# Patient Record
Sex: FEMALE | Race: WHITE
[De-identification: names, ages, dates, MRNs, and addresses within clinical notes are randomized per-mention and may not be internally consistent; named-entity substitution may affect disease eponyms.]

---

## 2019-01-09 ENCOUNTER — HOSPITAL ENCOUNTER (INPATIENT)
Dept: HOSPITAL 84 - D.SDCHOLD | Age: 59
LOS: 10 days | Discharge: HOME HEALTH | DRG: 470 | End: 2019-01-19
Attending: ORTHOPAEDIC SURGERY | Admitting: ORTHOPAEDIC SURGERY
Payer: MEDICAID

## 2019-01-09 VITALS
BODY MASS INDEX: 21.97 KG/M2 | HEIGHT: 59 IN | BODY MASS INDEX: 21.97 KG/M2 | WEIGHT: 109 LBS | BODY MASS INDEX: 21.97 KG/M2

## 2019-01-09 DIAGNOSIS — D62: ICD-10-CM

## 2019-01-09 DIAGNOSIS — N17.9: ICD-10-CM

## 2019-01-09 DIAGNOSIS — E66.9: ICD-10-CM

## 2019-01-09 DIAGNOSIS — I25.10: ICD-10-CM

## 2019-01-09 DIAGNOSIS — K21.9: ICD-10-CM

## 2019-01-09 DIAGNOSIS — I10: ICD-10-CM

## 2019-01-09 DIAGNOSIS — K58.9: ICD-10-CM

## 2019-01-09 DIAGNOSIS — M16.12: Primary | ICD-10-CM

## 2019-01-09 DIAGNOSIS — F41.9: ICD-10-CM

## 2019-01-09 LAB
ANION GAP SERPL CALC-SCNC: 13.1 MMOL/L (ref 8–16)
APPEARANCE UR: CLEAR
APTT BLD: 29.5 SECONDS (ref 22.8–39.4)
BASOPHILS NFR BLD AUTO: 0.3 % (ref 0–2)
BILIRUB SERPL-MCNC: NEGATIVE MG/DL
BUN SERPL-MCNC: 24 MG/DL (ref 7–18)
CALCIUM SERPL-MCNC: 9.5 MG/DL (ref 8.5–10.1)
CHLORIDE SERPL-SCNC: 101 MMOL/L (ref 98–107)
CO2 SERPL-SCNC: 28.5 MMOL/L (ref 21–32)
COLOR UR: YELLOW
CREAT SERPL-MCNC: 1 MG/DL (ref 0.6–1.3)
EOSINOPHIL NFR BLD: 4.6 % (ref 0–7)
ERYTHROCYTE [DISTWIDTH] IN BLOOD BY AUTOMATED COUNT: 12.8 % (ref 11.5–14.5)
GLUCOSE SERPL-MCNC: 94 MG/DL (ref 74–106)
GLUCOSE SERPL-MCNC: NEGATIVE MG/DL
HCT VFR BLD CALC: 40.9 % (ref 36–48)
HGB BLD-MCNC: 14.1 G/DL (ref 12–16)
IMM GRANULOCYTES NFR BLD: 0.2 % (ref 0–5)
INR PPP: 0.97 (ref 0.85–1.17)
KETONES UR STRIP-MCNC: NEGATIVE MG/DL
LYMPHOCYTES NFR BLD AUTO: 28.6 % (ref 15–50)
MCH RBC QN AUTO: 31 PG (ref 26–34)
MCHC RBC AUTO-ENTMCNC: 34.5 G/DL (ref 31–37)
MCV RBC: 89.9 FL (ref 80–100)
MONOCYTES NFR BLD: 6.8 % (ref 2–11)
NEUTROPHILS NFR BLD AUTO: 59.5 % (ref 40–80)
NITRITE UR-MCNC: NEGATIVE MG/ML
OSMOLALITY SERPL CALC.SUM OF ELEC: 281 MOSM/KG (ref 275–300)
PH UR STRIP: 5 [PH] (ref 5–6)
PLATELET # BLD: 223 10X3/UL (ref 130–400)
PMV BLD AUTO: 10.2 FL (ref 7.4–10.4)
POTASSIUM SERPL-SCNC: 3.6 MMOL/L (ref 3.5–5.1)
PROT UR-MCNC: NEGATIVE MG/DL
PROTHROMBIN TIME: 12.4 SECONDS (ref 11.6–15)
RBC # BLD AUTO: 4.55 10X6/UL (ref 4–5.4)
SODIUM SERPL-SCNC: 139 MMOL/L (ref 136–145)
SP GR UR STRIP: 1.01 (ref 1–1.02)
UROBILINOGEN UR-MCNC: NORMAL MG/DL
WBC # BLD AUTO: 5.9 10X3/UL (ref 4.8–10.8)

## 2019-01-15 VITALS — DIASTOLIC BLOOD PRESSURE: 64 MMHG | SYSTOLIC BLOOD PRESSURE: 126 MMHG

## 2019-01-15 VITALS — SYSTOLIC BLOOD PRESSURE: 132 MMHG | DIASTOLIC BLOOD PRESSURE: 58 MMHG

## 2019-01-15 VITALS — SYSTOLIC BLOOD PRESSURE: 94 MMHG | DIASTOLIC BLOOD PRESSURE: 41 MMHG

## 2019-01-15 VITALS — DIASTOLIC BLOOD PRESSURE: 53 MMHG | SYSTOLIC BLOOD PRESSURE: 110 MMHG

## 2019-01-15 PROCEDURE — 0SRB03Z REPLACEMENT OF LEFT HIP JOINT WITH CERAMIC SYNTHETIC SUBSTITUTE, OPEN APPROACH: ICD-10-PCS | Performed by: ORTHOPAEDIC SURGERY

## 2019-01-15 NOTE — OP
PATIENT NAME:  SUMEET SALAS                   MEDICAL RECORD: [id number]
:60                                             LOCATION:D.MS [id number]
                                                         ADMISSION DATE:01/15/19
SURGEON:  LORNE FREY DO         
 
 
DATE OF OPERATION:  01/15/2019
 
PROCEDURE PERFORMED:  Left total hip arthroplasty.
 
PREOPERATIVE DIAGNOSIS:  Severe left hip osteoarthritis.
 
POSTOPERATIVE DIAGNOSIS:  Severe left hip osteoarthritis.
 
INDICATIONS:  Ms. Salas is a 58-year-old female who has had left hip pain
and problems for quite some time.  She has tried all manner of nonoperative
treatment to no avail.  On x-ray, she did have her femoral head protruding into
past the teardrop, appeared to be protruding into the pelvis, but was not on AP
and she had very little to no motion.  As she had been dealing it for quite a
while, it started affecting her activities of daily living and requests a total
hip be done.  I informed her of the risks and benefits of the procedure
including infection, bleeding, damage to nerve and vessels, damage to the
lateral femoral cutaneous nerve, the femoral nerve and due to her size,
infection and need for further surgery.  She is okay with those risks as well as
the risk of blood clot and even death and she signed the consent.
 
SURGEON:  Lorne Frey DO
 
DESCRIPTION OF PROCEDURE:  The patient was given a block by anesthesia
preoperatively, taken to the operative suite, laid in supine position.  She was
intubated and then placed over onto the [facility] table.  She was given 80 mg of
gentamicin and a gram of vancomycin preoperatively.  The left hip was prepped
and draped in sterile fashion.  The timeout was performed.  Everyone was in
agreement with the correct side, site, patient and procedure.  Once that was
done, the incision began over the tensor fascia giovanni.  Careful dissection was
made down to the fascia and any bleeding was coagulated with Aquamantys at the
time.  The fascia was opened.  The fascia was taken anterior to the muscle belly
posteriorly.  The interval then between the rectus was opened and the rectus was
taken medially and the tensor fascia giovanni laterally.  The ascending branch of
the lateral femoral circumflex was then tied off and coagulated with the
Aquamantys and then cut.  Any bleeders were coagulated at that time.  Capsule
was then exposed and the femoral neck was exposed.  The neck was cut.  Then, the
head was removed with somewhat difficulty due to the small size and being so
deep into the acetabulum.  Once it was removed, the labrum was removed and
reaming began up to a 46, a 46 G7 cup was put in and impacted into place.  This
fit very well, was solidly fixed.  The liner was then put in and then the femur
was exposed with somewhat difficulty, but got it exposed.  A canal finder and
cookie cutter were used and then the broaching began at 4, 4 trial was put in
and seemed to be a little varus, it was taken out and then lateralized more with
a cookie cutter and went to a 6, the 6 fit very well.  Once the 6 fit, it was
reduced with a -6 neck and seemed to have good equal lengths to the right side. 
Once that was completed, the hip was reduced with a 32 ceramic head and -6 neck
was placed in a high offset micro stem, Taperloc stem was used.  This was
reduced and the x-rays were taken and seemed to fit very well and be good
lengths compared to the other side.  The wound was then thoroughly irrigated. 
Markus, vancomycin and tobramycin powder were placed deep into the wound and the
tensor fascia giovanni fascia was closed with #1 Vicryl, first a figure-of-eight and
then a running-locking stitch.  Then the skin was closed, the adipose tissue was
 
 
 
OPERATIVE REPORT                               [id number]    SUMEET SALAS 
 
 
approximated with #1 Vicryl with simple sutures and then the skin was closed
with 2-0 Vicryl in an inverted interrupted fashion, 4-0 Monocryl ran on the
skin.  Then, a Prevena incisional VAC was placed on the incision.  The patient
was awakened and taken to recovery in stable condition.  Blood loss was 200 mL
proximally.
 
COMPLICATIONS:  None.
 
TRANSINT:[id number] Voice Confirmation ID: [id number] DOCUMENT ID: [id number]
                                           
                                           LORNE FREY DO         
 
 
 
Electronically Signed by LORNE EDWARD on 01/15/19 at 1614
 
 
 
 
 
 
 
 
 
 
 
 
 
 
 
 
 
 
 
 
 
 
 
 
 
 
 
 
 
 
 
 
CC:                                                             [id number]
DICTATION DATE: 01/15/19 1314     :     01/15/19 1531      ADM IN  
                                                                              
[facility]                                          
[address] [place] [address]

## 2019-01-15 NOTE — NUR
CONTACTED ANESTHESIA REGARDING BORDERLINE HEART RATE AND BLOOD
PRESSURE FOR DISCHARGE. ORDERS RECEIVED FOR EPHEDRINE. SEE MAR. WILL
CONTINUE TO MONITOR.

## 2019-01-16 VITALS — SYSTOLIC BLOOD PRESSURE: 98 MMHG | DIASTOLIC BLOOD PRESSURE: 29 MMHG

## 2019-01-16 VITALS — SYSTOLIC BLOOD PRESSURE: 89 MMHG | DIASTOLIC BLOOD PRESSURE: 52 MMHG

## 2019-01-16 VITALS — DIASTOLIC BLOOD PRESSURE: 58 MMHG | SYSTOLIC BLOOD PRESSURE: 119 MMHG

## 2019-01-16 VITALS — SYSTOLIC BLOOD PRESSURE: 102 MMHG | DIASTOLIC BLOOD PRESSURE: 7 MMHG

## 2019-01-16 VITALS — DIASTOLIC BLOOD PRESSURE: 28 MMHG | SYSTOLIC BLOOD PRESSURE: 97 MMHG

## 2019-01-16 LAB
ANION GAP SERPL CALC-SCNC: 15.3 MMOL/L (ref 8–16)
BUN SERPL-MCNC: 23 MG/DL (ref 7–18)
CALCIUM SERPL-MCNC: 7.5 MG/DL (ref 8.5–10.1)
CHLORIDE SERPL-SCNC: 103 MMOL/L (ref 98–107)
CO2 SERPL-SCNC: 23.7 MMOL/L (ref 21–32)
CREAT SERPL-MCNC: 1.5 MG/DL (ref 0.6–1.3)
ERYTHROCYTE [DISTWIDTH] IN BLOOD BY AUTOMATED COUNT: 13.1 % (ref 11.5–14.5)
GLUCOSE SERPL-MCNC: 138 MG/DL (ref 74–106)
HCT VFR BLD CALC: 30.2 % (ref 36–48)
HGB BLD-MCNC: 10.1 G/DL (ref 12–16)
MCH RBC QN AUTO: 30.4 PG (ref 26–34)
MCHC RBC AUTO-ENTMCNC: 33.4 G/DL (ref 31–37)
MCV RBC: 91 FL (ref 80–100)
OSMOLALITY SERPL CALC.SUM OF ELEC: 281 MOSM/KG (ref 275–300)
PLATELET # BLD: 143 10X3/UL (ref 130–400)
PMV BLD AUTO: 9.8 FL (ref 7.4–10.4)
POTASSIUM SERPL-SCNC: 4 MMOL/L (ref 3.5–5.1)
RBC # BLD AUTO: 3.32 10X6/UL (ref 4–5.4)
SODIUM SERPL-SCNC: 138 MMOL/L (ref 136–145)
WBC # BLD AUTO: 6.9 10X3/UL (ref 4.8–10.8)

## 2019-01-16 NOTE — NUR
PT RESTING IN BED NO SIGNS OF DISTRESS. IV TO RIGHT HAND PATENT NO REDNESS OR
TENDERNESS. HAS INCISION TO LEFT HIP. DRESSING INTACT. HAS WOUND VAC PRESENT.
COMPLAINS OF PAIN AND NAUSEA. MEDS GIVEN. DENIES ANY NEED AT THIS TIME. CALL
LIGHT IN REACH. BED LOW POSITION. FAMILY AT BEDSIDE.

## 2019-01-16 NOTE — NUR
PT RESTING IN BED. ALERT AND ORIENTED. NO SIGNS OF DISTRESS. BREATHING EVEN
AND UNLABORED. PT STATES NO PROBLEMS AT THIS TIME. IV SITE RT HAND DRESSING
CLEAN DRY AND INTACT. NO SIGNS OF INFECTION. SKIN CLEAN DRY AND INTACT. BOWEL
SOUNDS ACTIVE. LT HIP DRESSING CLEAN DRY AND INTACT. WOUND VAC PRESENT WITH NO
DRAINAGE. PLEXI BOOTS ON. WILL CONTINUE PLAN OF CARE. CALL LIGHT IN REACH.

## 2019-01-17 VITALS — SYSTOLIC BLOOD PRESSURE: 118 MMHG | DIASTOLIC BLOOD PRESSURE: 37 MMHG

## 2019-01-17 VITALS — DIASTOLIC BLOOD PRESSURE: 40 MMHG | SYSTOLIC BLOOD PRESSURE: 97 MMHG

## 2019-01-17 VITALS — SYSTOLIC BLOOD PRESSURE: 110 MMHG | DIASTOLIC BLOOD PRESSURE: 52 MMHG

## 2019-01-17 VITALS — SYSTOLIC BLOOD PRESSURE: 108 MMHG | DIASTOLIC BLOOD PRESSURE: 40 MMHG

## 2019-01-17 VITALS — DIASTOLIC BLOOD PRESSURE: 50 MMHG | SYSTOLIC BLOOD PRESSURE: 117 MMHG

## 2019-01-17 VITALS — SYSTOLIC BLOOD PRESSURE: 96 MMHG | DIASTOLIC BLOOD PRESSURE: 34 MMHG

## 2019-01-17 LAB
ANION GAP SERPL CALC-SCNC: 12.8 MMOL/L (ref 8–16)
APPEARANCE UR: CLEAR
BILIRUB SERPL-MCNC: NEGATIVE MG/DL
BUN SERPL-MCNC: 23 MG/DL (ref 7–18)
CALCIUM SERPL-MCNC: 7.8 MG/DL (ref 8.5–10.1)
CHLORIDE SERPL-SCNC: 99 MMOL/L (ref 98–107)
CO2 SERPL-SCNC: 23.7 MMOL/L (ref 21–32)
COLOR UR: YELLOW
CREAT SERPL-MCNC: 1.7 MG/DL (ref 0.6–1.3)
ERYTHROCYTE [DISTWIDTH] IN BLOOD BY AUTOMATED COUNT: 12.9 % (ref 11.5–14.5)
GLUCOSE SERPL-MCNC: 145 MG/DL (ref 74–106)
GLUCOSE SERPL-MCNC: NEGATIVE MG/DL
HCT VFR BLD CALC: 26 % (ref 36–48)
HGB BLD-MCNC: 8.9 G/DL (ref 12–16)
KETONES UR STRIP-MCNC: NEGATIVE MG/DL
MCH RBC QN AUTO: 30.7 PG (ref 26–34)
MCHC RBC AUTO-ENTMCNC: 34.2 G/DL (ref 31–37)
MCV RBC: 89.7 FL (ref 80–100)
NITRITE UR-MCNC: NEGATIVE MG/ML
OSMOLALITY SERPL CALC.SUM OF ELEC: 271 MOSM/KG (ref 275–300)
PH UR STRIP: 5 [PH] (ref 5–6)
PLATELET # BLD: 125 10X3/UL (ref 130–400)
PMV BLD AUTO: 10 FL (ref 7.4–10.4)
POTASSIUM SERPL-SCNC: 3.5 MMOL/L (ref 3.5–5.1)
PROT UR-MCNC: NEGATIVE MG/DL
RBC # BLD AUTO: 2.9 10X6/UL (ref 4–5.4)
RBC #/AREA URNS HPF: (no result) /HPF (ref 0–5)
SODIUM SERPL-SCNC: 132 MMOL/L (ref 136–145)
SP GR UR STRIP: 1.01 (ref 1–1.02)
SQUAMOUS #/AREA URNS HPF: (no result) /HPF (ref 0–5)
UROBILINOGEN UR-MCNC: NORMAL MG/DL
WBC # BLD AUTO: 6.7 10X3/UL (ref 4.8–10.8)
WBC #/AREA URNS HPF: (no result) /HPF (ref 0–5)

## 2019-01-17 NOTE — NUR
HS MEDICATIONS GIVEN. WILL CONTINUE TO MONITOR FOR NEEDS. ASSISTED PT UP TO
USE RESTROOM...AMBULATES WELL WITH WALKER.

## 2019-01-17 NOTE — MORECARE
CASE MANAGEMENT DISCHARGE SUMMARY
 
 
PATIENT: SUMEET MCGREGOR SHARP             UNIT: [id number]
ACCOUNT#: [id number]                       ADM DATE: 01/15/19
AGE: 58     : 60  SEX: F            ROOM/BED: D.2216    
AUTHOR: MARY JORDAN                             PHYSICIAN:                               
 
REFERRING PHYSICIAN: BERNADETTE FREY DO         
DATE OF SERVICE: 19
Discharge Plan
 
 
Patient Name: SUMEET MCGREGOR
Facility: [facility]:[place]
Encounter #: [id number]
Medical Record #: [id number]
: 1960
Planned Disposition: Home or Self Care
Anticipated Discharge Date: 
 
Discharge Date: 
Expected LOS: 
Initial Reviewer: [id number]
Initial Review Date: 01/15/2019
Generated: 19   3:00 pm 
 DCPIA - Discharge Planning Initial Assessment
 
Updated by [email or web address]: Charlene Shea on 19   1:58 pm
*  Is the patient Alert and Oriented?
Yes
*  How many steps to enter\exit or inside your home?
 
*  PCP
[facility]
*  Pharmacy
[facility] ON [address]
*  Preadmission Environment
Home with Family
*  ADLs
Independent
*  Equipment
Bedside Commode
Rolling Walker
*  List name and contact numbers for known caregivers / representatives who 
currently or will assist patient after discharge:
DARREN OROPEZA  [phone]
*  Verbal permission to speak to the caregivers and representatives has been 
obtained from the patient.
N/A
 
*  Community resources currently utilized
None
*  Additional services required to return to the preadmission environment?
Yes
*  Can the patient safely return to the preadmission environment?
Yes
*  Has this patient been hospitalized within the prior 30 days at any 
hospital?
No
 
 
 
 
 
 
Patient Name: SUMEET MCGREGOR
Encounter #: [id number]
Page [id number]
 
 
 
 
 
Electronically Signed by MARY JORDAN on 19 at 1400
 
 
 
 
 
 
**All edits/amendments must be made on the electronic document**
 
DICTATION DATE: 19 1359     : GERARD  19 1359     
RPT#: [id number]                                DC DATE:        
                                               STATUS: ADM IN  
[facility]
 [place] [address]
***END OF REPORT***

## 2019-01-17 NOTE — MORECARE
CASE MANAGEMENT DISCHARGE SUMMARY
 
 
PATIENT: SUMEET MCGREGOR SHARP             UNIT: [id number]
ACCOUNT#: [id number]                       ADM DATE: 01/15/19
AGE: 58     : 60  SEX: F            ROOM/BED: D.2216    
AUTHOR: JULIAN,MARY                             PHYSICIAN:                               
 
REFERRING PHYSICIAN: BERNADETTE FREY DO         
DATE OF SERVICE: 19
Discharge Plan
 
 
Patient Name: SUMEET MCGREGOR
Facility: [facility]:[place]
Encounter #: [id number]
Medical Record #: [id number]
: 1960
Planned Disposition: Home or Self Care
Anticipated Discharge Date: 
 
Discharge Date: 
Expected LOS: 
Initial Reviewer: [id number]
Initial Review Date: 01/15/2019
Generated: 19   3:54 pm 
Comments
 
DCP- Discharge Planning
 
Updated by [id number]: Charlene Shea on 19   1:07 pm CT
Patient Name: SUMEET MCGREGOR                                     
Admission Status: Elective   
Accout number: [id number]                              
Admission Date: 01-   
: 1960                                                        
Admission Diagnosis:   
Attending: BERNADETTE FREY                                                
Current LOS:  2   
  
Anticipated DC Date:    
Planned Disposition: Home or Self Care   
Primary Insurance: [place]   
  
  
Discharge Planning Comments:   
CM met with patient to assess discharge planning needs. Patient stated that 
she is independent with her care at home. Her boyfriend will be the one to 
drive her home and will be the one to help her at home. She has 4 steps to 
enter in her home. She would like to go to OP PT in [place].  [facility] in [place] for  at 3:30 pm. Patient has a 
rollator walker and bsc that was delivered to the hospital. Blanco with PT 
stated that he felt she was safe with the rollator at home.  She feels safe 
to go home. CM will continue to follow and assist with DC planning   
  
  
  
  
  
: Charlene Shea
 DCPIA - Discharge Planning Initial Assessment
 
Updated by [id number]: Charlene Shea on 19   1:58 pm
*  Is the patient Alert and Oriented?
Yes
*  How many steps to enter\exit or inside your home?
 
*  PCP
[facility]
*  Pharmacy
[facility] [address]
*  Preadmission Environment
Home with Family
*  ADLs
Independent
*  Equipment
Bedside Commode
Rolling Walker
*  List name and contact numbers for known caregivers / representatives who 
currently or will assist patient after discharge:
DARREN OROPEZA  [phone]
*  Verbal permission to speak to the caregivers and representatives has been 
obtained from the patient.
N/A
*  Community resources currently utilized
None
*  Additional services required to return to the preadmission environment?
Yes
*  Can the patient safely return to the preadmission environment?
Yes
*  Has this patient been hospitalized within the prior 30 days at any 
hospital?
No
 
 
 
 
 
 
 
Last DP export: 19   1:09 p
Patient Name: SUMEET MCGREGOR
 
Encounter #: [id number]
Page [phone]
 
 
 
 
 
Electronically Signed by MARY JORDAN on 19 at 1454
 
 
 
 
 
 
**All edits/amendments must be made on the electronic document**
 
DICTATION DATE: 19     : GERARD  19     
RPT#: [id number]                                DC DATE:        
                                               STATUS: ADM IN  
[facility]
 [place] [address]
***END OF REPORT***

## 2019-01-17 NOTE — NUR
GAVE ULTRAM 50 MG AND VISTARIL PO PER PRN ORDERS FOR C/O PAIN IN KNEE. WILL
MONITOR FOR EFFECTIVENESS. CALL LIGHT WITHIN REACH.

## 2019-01-17 NOTE — MORECARE
CASE MANAGEMENT DISCHARGE SUMMARY
 
 
PATIENT: SUMEET MCGREGOR SHARP             UNIT: [id number]
ACCOUNT#: [id number]                       ADM DATE: 01/15/19
AGE: 58     : 60  SEX: F            ROOM/BED: D.2216    
AUTHOR: JULIAN,DOC                             PHYSICIAN:                               
 
REFERRING PHYSICIAN: BERNADETTE FREY DO         
DATE OF SERVICE: 19
Discharge Plan
 
 
Patient Name: SUMEET MCGREGOR
Facility: [facility]:[place]
Encounter #: [id number]
Medical Record #: [id number]
: 1960
Planned Disposition: Home or Self Care
Anticipated Discharge Date: 
 
Discharge Date: 
Expected LOS: 
Initial Reviewer: [email or web address]
Initial Review Date: 01/15/2019
Generated: 19   4:03 pm 
Comments
 
DCP- Discharge Planning
 
Updated by [email or web address]: Charlene Shea on 19   1:55 pm CT
[facility] in [place] called back and stated that they do 
not take her insurance. Patient would like to use home health [facility] with [facility] 
(1st choice and [employer] 2nd choice)  I called [facility] spoke with 
[employer]. Clinicals will be faxed and they will accept
DCP- Discharge Planning
 
Updated by [email or web address]: Charlene Shea on 19   1:07 pm CT
Patient Name: SUMEET MCGREGOR                                     
Admission Status: Elective   
Accout number: [id number]                              
Admission Date: 01-   
: 1960                                                        
Admission Diagnosis:   
Attending: BERNADETTE FREY                                                
Current LOS:  2   
  
Anticipated DC Date:    
Planned Disposition: Home or Self Care   
Primary Insurance: MED[place]ID [place]   
 
  
  
Discharge Planning Comments:   
CM met with patient to assess discharge planning needs. Patient stated that 
she is independent with her care at home. Her boyfriend will be the one to 
drive her home and will be the one to help her at home. She has 4 steps to 
enter in her home. She would like to go to OP PT in [place].  [facility] in [place] for  at 3:30 pm. Patient has a 
rollator walker and bsc that was delivered to the hospital. Blanco with PT 
stated that he felt she was safe with the rollator at home.  She feels safe 
to go home. CM will continue to follow and assist with DC planning   
  
  
  
  
  
: Charlene Shea
 DCPIA - Discharge Planning Initial Assessment
 
Updated by [email or web address]: Charlene Shea on 19   1:58 pm
*  Is the patient Alert and Oriented?
Yes
*  How many steps to enter\exit or inside your home?
 
*  PCP
[facility]
*  Pharmacy
[facility] [address]
*  Preadmission Environment
Home with Family
*  ADLs
Independent
*  Equipment
Bedside Commode
Rolling Walker
*  List name and contact numbers for known caregivers / representatives who 
currently or will assist patient after discharge:
DARREN OROPEZA  [phone]
*  Verbal permission to speak to the caregivers and representatives has been 
obtained from the patient.
N/A
*  Community resources currently utilized
None
*  Additional services required to return to the preadmission environment?
Yes
*  Can the patient safely return to the preadmission environment?
Yes
*  Has this patient been hospitalized within the prior 30 days at any 
hospital?
No
 
External Providers
 
[facility]
 
Next Contact Date: 
Service Request Date: 
Service Type: 
Resolution: 
 
Reviewer: 
Comments: 
 
 
 
 
 
 
Last DP export: 19   1:54 p
Patient Name: SUMEET MCGREGOR
Encounter #: [id number]
Page [phone]
 
 
 
 
 
Electronically Signed by MARY JORDAN on 19 at 1503
 
 
 
 
 
 
**All edits/amendments must be made on the electronic document**
 
DICTATION DATE: 19     : GERARD  19 1503     
RPT#: [id number]                                DC DATE:        
                                               STATUS: ADM IN  
[facility]
[address] [place] [address]
***END OF REPORT***

## 2019-01-17 NOTE — NUR
REPORT RECEIVED AND CARE OF PT ASSUMED. PT LYING IN SEMI HALE'S POSITION
WATCHING TV. NO IV AT THIS TIME. PROVENA WOUND VAC IN PLACE ON LEFT HIP
INCISION. WILL MONITOR FOR NEEDS.

## 2019-01-18 VITALS — SYSTOLIC BLOOD PRESSURE: 83 MMHG | DIASTOLIC BLOOD PRESSURE: 47 MMHG

## 2019-01-18 VITALS — DIASTOLIC BLOOD PRESSURE: 45 MMHG | SYSTOLIC BLOOD PRESSURE: 104 MMHG

## 2019-01-18 VITALS — SYSTOLIC BLOOD PRESSURE: 118 MMHG | DIASTOLIC BLOOD PRESSURE: 49 MMHG

## 2019-01-18 VITALS — DIASTOLIC BLOOD PRESSURE: 56 MMHG | SYSTOLIC BLOOD PRESSURE: 131 MMHG

## 2019-01-18 LAB
ALBUMIN SERPL-MCNC: 2.5 G/DL (ref 3.4–5)
ALP SERPL-CCNC: 79 U/L (ref 46–116)
ALT SERPL-CCNC: 30 U/L (ref 10–68)
ANION GAP SERPL CALC-SCNC: 14.1 MMOL/L (ref 8–16)
BASOPHILS NFR BLD AUTO: 0 % (ref 0–2)
BILIRUB SERPL-MCNC: 0.46 MG/DL (ref 0.2–1.3)
BUN SERPL-MCNC: 17 MG/DL (ref 7–18)
CALCIUM SERPL-MCNC: 8.3 MG/DL (ref 8.5–10.1)
CHLORIDE SERPL-SCNC: 102 MMOL/L (ref 98–107)
CO2 SERPL-SCNC: 22.9 MMOL/L (ref 21–32)
CREAT SERPL-MCNC: 1.7 MG/DL (ref 0.6–1.3)
EOSINOPHIL NFR BLD: 2.5 % (ref 0–7)
ERYTHROCYTE [DISTWIDTH] IN BLOOD BY AUTOMATED COUNT: 12.8 % (ref 11.5–14.5)
GLOBULIN SER-MCNC: 3.6 G/L
GLUCOSE SERPL-MCNC: 120 MG/DL (ref 74–106)
HCT VFR BLD CALC: 26.1 % (ref 36–48)
HGB BLD-MCNC: 9 G/DL (ref 12–16)
IMM GRANULOCYTES NFR BLD: 0.2 % (ref 0–5)
LYMPHOCYTES NFR BLD AUTO: 13.7 % (ref 15–50)
MCH RBC QN AUTO: 30.7 PG (ref 26–34)
MCHC RBC AUTO-ENTMCNC: 34.5 G/DL (ref 31–37)
MCV RBC: 89.1 FL (ref 80–100)
MONOCYTES NFR BLD: 7.8 % (ref 2–11)
NEUTROPHILS NFR BLD AUTO: 75.8 % (ref 40–80)
OSMOLALITY SERPL CALC.SUM OF ELEC: 272 MOSM/KG (ref 275–300)
PLATELET # BLD: 165 10X3/UL (ref 130–400)
PMV BLD AUTO: 10.1 FL (ref 7.4–10.4)
POTASSIUM SERPL-SCNC: 4 MMOL/L (ref 3.5–5.1)
PROT SERPL-MCNC: 6.1 G/DL (ref 6.4–8.2)
RBC # BLD AUTO: 2.93 10X6/UL (ref 4–5.4)
SODIUM SERPL-SCNC: 135 MMOL/L (ref 136–145)
WBC # BLD AUTO: 6.4 10X3/UL (ref 4.8–10.8)

## 2019-01-18 NOTE — NUR
HAS REMAINED WITHOUT DISTRESS.REMAINS WITHOUT CHANGE FROM INTITIAL SHIFT
ASSESSMENT.CONT PLAN OF CARE

## 2019-01-18 NOTE — NUR
REPORT RECEIVED AND CARE OF PT ASSUMED. PT LYING IN SUPINE POSITION WITH EYES
CLOSED. NO IV SITED. PROVENA WOUND VAC IN PLACE ON LEFT HIP. WILL MONITOR FOR
NEEDS.

## 2019-01-18 NOTE — NUR
HS MEDICATIONS GIVEN TO INCLUDE ZOFRAN, ULTRAM, AND VISTARIL FOR NAUSEA AND
PAIN. WILL CONTINUE TO MONITOR FOR NEEDS. CALL LIGHT WITHIN REACH.

## 2019-01-19 VITALS — DIASTOLIC BLOOD PRESSURE: 43 MMHG | SYSTOLIC BLOOD PRESSURE: 101 MMHG

## 2019-01-19 VITALS — DIASTOLIC BLOOD PRESSURE: 40 MMHG | SYSTOLIC BLOOD PRESSURE: 119 MMHG

## 2019-01-19 VITALS — DIASTOLIC BLOOD PRESSURE: 40 MMHG | SYSTOLIC BLOOD PRESSURE: 110 MMHG

## 2019-01-19 VITALS — SYSTOLIC BLOOD PRESSURE: 101 MMHG | DIASTOLIC BLOOD PRESSURE: 43 MMHG

## 2019-01-19 LAB
ANION GAP SERPL CALC-SCNC: 13.9 MMOL/L (ref 8–16)
BUN SERPL-MCNC: 19 MG/DL (ref 7–18)
CALCIUM SERPL-MCNC: 8.7 MG/DL (ref 8.5–10.1)
CHLORIDE SERPL-SCNC: 105 MMOL/L (ref 98–107)
CO2 SERPL-SCNC: 24.1 MMOL/L (ref 21–32)
CREAT SERPL-MCNC: 1.6 MG/DL (ref 0.6–1.3)
GLUCOSE SERPL-MCNC: 89 MG/DL (ref 74–106)
OSMOLALITY SERPL CALC.SUM OF ELEC: 278 MOSM/KG (ref 275–300)
POTASSIUM SERPL-SCNC: 4 MMOL/L (ref 3.5–5.1)
SODIUM SERPL-SCNC: 139 MMOL/L (ref 136–145)

## 2019-01-20 NOTE — MORECARE
CASE MANAGEMENT DISCHARGE SUMMARY
 
 
PATIENT: SUMEET MCGREGOR SHARP             UNIT: [id number]
ACCOUNT#: [id number]                       ADM DATE: 01/15/19
AGE: 58     : 60  SEX: F            ROOM/BED: D.0836    
AUTHOR: JULIAN,DOC                             PHYSICIAN:                               
 
REFERRING PHYSICIAN: BERNADETTE FREY DO         
DATE OF SERVICE: 19
Discharge Plan
 
 
Patient Name: SUMEET MCGREGOR
Facility: [facility]:[place]
Encounter #: [id number]
Medical Record #: [id number]
: 1960
Planned Disposition: Home or Self Care
Anticipated Discharge Date: 
 
Discharge Date: 2019
Expected LOS: 
Initial Reviewer: [email or web address]
Initial Review Date: 01/15/2019
Generated: 19   2:32 pm 
Comments
 
DCP- Discharge Planning
 
Updated by [email or web address]: Dana Alvarado on 19  12:30 pm CT
CONTACT INFORMATION   
[phone]
DCP- Discharge Planning
 
Updated by [id number]: Dana Alvarado on 19  12:29 pm CT
LATE ENTRY 19  
PATIENT FOR DISCHARGE TO HOME WITH [facility].  
CM VISITED AT THE BEDSIDE.PATIENT HAD COMMODE AT THE BEDSIDE. STATED SHE 
THOUGHT THEY WERE TO DELIVER THE COMMODE TO HER HOME. SHE DID NOT FEEL THE 
COMMODE WOULD WORK.   
DESCRIBED A RAISED TOILET SEAT WITH HAND RAILS. SHE DID NOT KNOW THE 
PROVIDERS NAME. CM CHECKED THE LABEL.   
TELEPHONED [facility]. EXPLAINED PATIENT'S CONCERN. HAD THE ON CALL 
TO SPEAK DIRECTLY TO THE PATIENT TO ANSWER QUESTIONS AND RESOLVE ISSUE 
REGARDING DME.  
1452 TC TO [facility] TO ADVISE OF PLANNED DISCHARGE. SPOKE WITH 
JHONNY THEN THE ON CALL.  
FAXED DISCHARGE INSTRUCTIONS AND MED SHEET .
 
DCP- Discharge Planning
 
Updated by [id number]: Charlene Shea on 19   1:55 pm CT
[facility] in [place] called back and stated that they do 
not take her insurance. Patient would like to use home health [place] with [facility] 
(1st choice and [facility] 2nd choice)  I called [facility] spoke with 
[employer]. Clinicals will be faxed and they will accept
DCP- Discharge Planning
 
Updated by [id number]: Charlene Shea on 19   1:07 pm CT
Patient Name: SUMEET MCGREGOR                                     
Admission Status: Elective   
Accout number: [id number]                              
Admission Date: 01-   
: 1960                                                        
Admission Diagnosis:   
Attending: BERNADETTE FREY                                                
Current LOS:  2   
  
Anticipated DC Date:    
Planned Disposition: Home or Self Care   
Primary Insurance: [place]   
  
  
Discharge Planning Comments:   
CM met with patient to assess discharge planning needs. Patient stated that 
she is independent with her care at home. Her boyfriend will be the one to 
drive her home and will be the one to help her at home. She has 4 steps to 
enter in her home. She would like to go to OP PT in [place].  [facility] in [place] for  at 3:30 pm. Patient has a 
rollator walker and bsc that was delivered to the hospital. Blanco with PT 
stated that he felt she was safe with the rollator at home.  She feels safe 
to go home. CM will continue to follow and assist with DC planning   
  
  
  
  
  
: Charlene Shea
 DCPIA - Discharge Planning Initial Assessment
 
Updated by [id number]: Charlene Shea on 19   1:58 pm
*  Is the patient Alert and Oriented?
Yes
*  How many steps to enter\exit or inside your home?
 
*  PCP
[facility]
*  Pharmacy
[facility] [address]
*  Preadmission Environment
Home with Family
 
*  ADLs
Independent
*  Equipment
Bedside Commode
Rolling Walker
*  List name and contact numbers for known caregivers / representatives who 
currently or will assist patient after discharge:
DARREN OROPEZA  [phone]
*  Verbal permission to speak to the caregivers and representatives has been 
obtained from the patient.
N/A
*  Community resources currently utilized
None
*  Additional services required to return to the preadmission environment?
Yes
*  Can the patient safely return to the preadmission environment?
Yes
*  Has this patient been hospitalized within the prior 30 days at any 
hospital?
No
 
 
 
 
 
 
 
Last DP export: 19   2:03 p
Patient Name: SUMEET MCGREGOR
Encounter #: [id number]
Page [phone]
 
 
 
 
 
Electronically Signed by MARY JORDAN on 19 at 1332
 
 
 
 
 
 
**All edits/amendments must be made on the electronic document**
 
DICTATION DATE: 19 1331     : DM  19 1331     
RPT#: [id number]                                DC DATE:19
                                               STATUS: DIS IN  
[facility]
[address] [place] [address]
***END OF REPORT***

## 2019-01-24 ENCOUNTER — HOSPITAL ENCOUNTER (OUTPATIENT)
Dept: HOSPITAL 84 - D.LABREF | Age: 59
Discharge: HOME | End: 2019-01-24
Attending: FAMILY MEDICINE
Payer: MEDICAID

## 2019-01-24 VITALS — BODY MASS INDEX: 22 KG/M2

## 2019-01-24 DIAGNOSIS — I10: Primary | ICD-10-CM

## 2019-01-24 DIAGNOSIS — I25.10: ICD-10-CM

## 2019-01-24 LAB
ALBUMIN SERPL-MCNC: 2.8 G/DL (ref 3.4–5)
ALP SERPL-CCNC: 96 U/L (ref 46–116)
ALT SERPL-CCNC: 34 U/L (ref 10–68)
ANION GAP SERPL CALC-SCNC: 14.5 MMOL/L (ref 8–16)
APPEARANCE UR: CLEAR
BASOPHILS NFR BLD AUTO: 0.3 % (ref 0–2)
BILIRUB SERPL-MCNC: 0.2 MG/DL (ref 0.2–1.3)
BILIRUB SERPL-MCNC: NEGATIVE MG/DL
BUN SERPL-MCNC: 33 MG/DL (ref 7–18)
CALCIUM SERPL-MCNC: 8.5 MG/DL (ref 8.5–10.1)
CHLORIDE SERPL-SCNC: 100 MMOL/L (ref 98–107)
CO2 SERPL-SCNC: 29.5 MMOL/L (ref 21–32)
COLOR UR: YELLOW
CREAT SERPL-MCNC: 1.5 MG/DL (ref 0.6–1.3)
EOSINOPHIL NFR BLD: 4.6 % (ref 0–7)
ERYTHROCYTE [DISTWIDTH] IN BLOOD BY AUTOMATED COUNT: 13.2 % (ref 11.5–14.5)
GLOBULIN SER-MCNC: 3.4 G/L
GLUCOSE SERPL-MCNC: 82 MG/DL (ref 74–106)
GLUCOSE SERPL-MCNC: NEGATIVE MG/DL
HCT VFR BLD CALC: 27 % (ref 36–48)
HGB BLD-MCNC: 8.6 G/DL (ref 12–16)
IMM GRANULOCYTES NFR BLD: 0.4 % (ref 0–5)
KETONES UR STRIP-MCNC: NEGATIVE MG/DL
LYMPHOCYTES NFR BLD AUTO: 18 % (ref 15–50)
MCH RBC QN AUTO: 30 PG (ref 26–34)
MCHC RBC AUTO-ENTMCNC: 31.9 G/DL (ref 31–37)
MCV RBC: 94.1 FL (ref 80–100)
MONOCYTES NFR BLD: 7.4 % (ref 2–11)
NEUTROPHILS NFR BLD AUTO: 69.3 % (ref 40–80)
NITRITE UR-MCNC: NEGATIVE MG/ML
OSMOLALITY SERPL CALC.SUM OF ELEC: 283 MOSM/KG (ref 275–300)
PH UR STRIP: 7 [PH] (ref 5–6)
PLATELET # BLD: 345 10X3/UL (ref 130–400)
PMV BLD AUTO: 8.8 FL (ref 7.4–10.4)
POTASSIUM SERPL-SCNC: 5 MMOL/L (ref 3.5–5.1)
PROT SERPL-MCNC: 6.2 G/DL (ref 6.4–8.2)
PROT UR-MCNC: NEGATIVE MG/DL
RBC # BLD AUTO: 2.87 10X6/UL (ref 4–5.4)
SODIUM SERPL-SCNC: 139 MMOL/L (ref 136–145)
SP GR UR STRIP: 1 (ref 1–1.02)
UROBILINOGEN UR-MCNC: NORMAL MG/DL
WBC # BLD AUTO: 6.8 10X3/UL (ref 4.8–10.8)

## 2019-01-25 NOTE — MORECARE
CASE MANAGEMENT DISCHARGE SUMMARY
 
 
PATIENT: SUMEET MCGREGOR SHARP             UNIT: [id number]
ACCOUNT#: [id number]                       ADM DATE: 01/15/19
AGE: 59     : 60  SEX: F            ROOM/BED: D.0366    
AUTHOR: JULIAN,DOC                             PHYSICIAN:                               
 
REFERRING PHYSICIAN: BERNADETTE FREY DO         
DATE OF SERVICE: 19
Discharge Plan
 
 
Patient Name: SUMEET MCGREGOR
Facility: [facility]:[place]
Encounter #: [id number]
Medical Record #: [id number]
: 1960
Planned Disposition: Home or Self Care
Anticipated Discharge Date: 
 
Discharge Date: 2019
Expected LOS: 0
Initial Reviewer: [id number]
Initial Review Date: 01/15/2019
Generated: 19  12:46 pm 
Comments
 
DCP- Discharge Planning
 
Updated by [id number]: Dana Alvarado on 19  12:30 pm CT
CONTACT INFORMATION   
[phone]
DCP- Discharge Planning
 
Updated by [id number]: Dana Alvarado on 19  12:29 pm CT
LATE ENTRY 19  
PATIENT FOR DISCHARGE TO HOME WITH [facility].  
CM VISITED AT THE BEDSIDE.PATIENT HAD COMMODE AT THE BEDSIDE. STATED SHE 
THOUGHT THEY WERE TO DELIVER THE COMMODE TO HER HOME. SHE DID NOT FEEL THE 
COMMODE WOULD WORK.   
DESCRIBED A RAISED TOILET SEAT WITH HAND RAILS. SHE DID NOT KNOW THE 
PROVIDERS NAME. CM CHECKED THE LABEL.   
TELEPHONED [facility]. EXPLAINED PATIENT'S CONCERN. HAD THE ON CALL 
TO SPEAK DIRECTLY TO THE PATIENT TO ANSWER QUESTIONS AND RESOLVE ISSUE 
REGARDING DME.  
1452 TC TO [facility] TO ADVISE OF PLANNED DISCHARGE. SPOKE WITH 
JHONNY THEN THE ON CALL.  
FAXED DISCHARGE INSTRUCTIONS AND MED SHEET .
 
DCP- Discharge Planning
 
Updated by [id number]: Charlene Shea on 19   1:55 pm CT
[facility] in [place] called back and stated that they do 
not take her insurance. Patient would like to use home health [facility] 
(1st choice and [facility] 2nd choice)  I called [facility] spoke with 
[facility]. Clinicals will be faxed and they will accept
DCP- Discharge Planning
 
Updated by [id number]: Charlene Shea on 19   1:07 pm CT
Patient Name: SUMEET MCGREGOR                                     
Admission Status: Elective   
Accout number: [id number]                              
Admission Date: 01-   
: 1960                                                        
Admission Diagnosis:   
Attending: BERNADETTE FREY                                                
Current LOS:  2   
  
Anticipated DC Date:    
Planned Disposition: Home or Self Care   
Primary Insurance: [place]   
  
  
Discharge Planning Comments:   
CM met with patient to assess discharge planning needs. Patient stated that 
she is independent with her care at home. Her boyfriend will be the one to 
drive her home and will be the one to help her at home. She has 4 steps to 
enter in her home. She would like to go to OP PT in [place].  [facility] in [place] for  at 3:30 pm. Patient has a 
rollator walker and bsc that was delivered to the hospital. Blanco with PT 
stated that he felt she was safe with the rollator at home.  She feels safe 
to go home. CM will continue to follow and assist with DC planning   
  
  
  
  
  
: Charlene Shea
 DCPIA - Discharge Planning Initial Assessment
 
Updated by [id number]: Charlene Shea on 19   1:58 pm
*  Is the patient Alert and Oriented?
Yes
*  How many steps to enter\exit or inside your home?
 
*  PCP
[facility]
*  Pharmacy
[facility] [address]
*  Preadmission Environment
Home with Family
 
*  ADLs
Independent
*  Equipment
Bedside Commode
Rolling Walker
*  List name and contact numbers for known caregivers / representatives who 
currently or will assist patient after discharge:
DARREN OROPEZA  [phone]
*  Verbal permission to speak to the caregivers and representatives has been 
obtained from the patient.
N/A
*  Community resources currently utilized
None
*  Additional services required to return to the preadmission environment?
Yes
*  Can the patient safely return to the preadmission environment?
Yes
*  Has this patient been hospitalized within the prior 30 days at any 
hospital?
No
 
 
 
 
 
 
 
Last DP export: 19  12:32 p
Patient Name: SUMEET MCGREGOR
Encounter #: [id number]
Page [id number]
 
 
 
 
 
Electronically Signed by MARY JORDAN on 19 at 1146
 
 
 
 
 
 
**All edits/amendments must be made on the electronic document**
 
DICTATION DATE: 19 0987     : GERARD  19 1145     
RPT#: [id number]                                DC DATE:19
                                               STATUS: DIS IN  
[facility]
[address] [place] [address]
***END OF REPORT***

## 2019-02-22 ENCOUNTER — HOSPITAL ENCOUNTER (OUTPATIENT)
Dept: HOSPITAL 84 - D.MRI | Age: 59
Discharge: HOME | End: 2019-02-22
Attending: NURSE PRACTITIONER
Payer: MEDICAID

## 2019-02-22 VITALS — BODY MASS INDEX: 22 KG/M2

## 2019-02-22 DIAGNOSIS — M25.562: Primary | ICD-10-CM

## 2019-08-01 ENCOUNTER — HOSPITAL ENCOUNTER (INPATIENT)
Dept: HOSPITAL 84 - D.MS | Age: 59
LOS: 8 days | Discharge: HOME HEALTH | DRG: 470 | End: 2019-08-09
Attending: ORTHOPAEDIC SURGERY | Admitting: ORTHOPAEDIC SURGERY
Payer: MEDICAID

## 2019-08-01 VITALS
BODY MASS INDEX: 39.79 KG/M2 | WEIGHT: 200 LBS | HEIGHT: 59.5 IN | BODY MASS INDEX: 39.79 KG/M2 | BODY MASS INDEX: 39.79 KG/M2

## 2019-08-01 DIAGNOSIS — I10: ICD-10-CM

## 2019-08-01 DIAGNOSIS — E78.5: ICD-10-CM

## 2019-08-01 DIAGNOSIS — F41.0: ICD-10-CM

## 2019-08-01 DIAGNOSIS — M54.9: ICD-10-CM

## 2019-08-01 DIAGNOSIS — K21.9: ICD-10-CM

## 2019-08-01 DIAGNOSIS — J45.909: ICD-10-CM

## 2019-08-01 DIAGNOSIS — M16.11: Primary | ICD-10-CM

## 2019-08-01 DIAGNOSIS — D62: ICD-10-CM

## 2019-08-01 DIAGNOSIS — K58.9: ICD-10-CM

## 2019-08-01 DIAGNOSIS — F41.9: ICD-10-CM

## 2019-08-01 DIAGNOSIS — M19.90: ICD-10-CM

## 2019-08-01 DIAGNOSIS — N95.9: ICD-10-CM

## 2019-08-01 DIAGNOSIS — I20.9: ICD-10-CM

## 2019-08-05 LAB
ANION GAP SERPL CALC-SCNC: 11.5 MMOL/L (ref 8–16)
APPEARANCE UR: CLEAR
BASOPHILS NFR BLD AUTO: 0.2 % (ref 0–2)
BILIRUB SERPL-MCNC: NEGATIVE MG/DL
BUN SERPL-MCNC: 17 MG/DL (ref 7–18)
CALCIUM SERPL-MCNC: 9.1 MG/DL (ref 8.5–10.1)
CHLORIDE SERPL-SCNC: 107 MMOL/L (ref 98–107)
CO2 SERPL-SCNC: 27.8 MMOL/L (ref 21–32)
COLOR UR: YELLOW
CREAT SERPL-MCNC: 0.9 MG/DL (ref 0.6–1.3)
EOSINOPHIL NFR BLD: 5.6 % (ref 0–7)
ERYTHROCYTE [DISTWIDTH] IN BLOOD BY AUTOMATED COUNT: 13 % (ref 11.5–14.5)
GLUCOSE SERPL-MCNC: 97 MG/DL (ref 74–106)
GLUCOSE SERPL-MCNC: NEGATIVE MG/DL
HCT VFR BLD CALC: 36.7 % (ref 36–48)
HGB BLD-MCNC: 12.3 G/DL (ref 12–16)
IMM GRANULOCYTES NFR BLD: 0.2 % (ref 0–5)
INR PPP: 1.04 (ref 0.85–1.17)
KETONES UR STRIP-MCNC: NEGATIVE MG/DL
LYMPHOCYTES NFR BLD AUTO: 28.2 % (ref 15–50)
MCH RBC QN AUTO: 30.4 PG (ref 26–34)
MCHC RBC AUTO-ENTMCNC: 33.5 G/DL (ref 31–37)
MCV RBC: 90.8 FL (ref 80–100)
MONOCYTES NFR BLD: 5 % (ref 2–11)
NEUTROPHILS NFR BLD AUTO: 60.8 % (ref 40–80)
NITRITE UR-MCNC: NEGATIVE MG/ML
OSMOLALITY SERPL CALC.SUM OF ELEC: 284 MOSM/KG (ref 275–300)
PH UR STRIP: 5 [PH] (ref 5–6)
PLATELET # BLD: 188 10X3/UL (ref 130–400)
PMV BLD AUTO: 9.9 FL (ref 7.4–10.4)
POTASSIUM SERPL-SCNC: 4.3 MMOL/L (ref 3.5–5.1)
PROT UR-MCNC: NEGATIVE MG/DL
PROTHROMBIN TIME: 13.1 SECONDS (ref 11.6–15)
RBC # BLD AUTO: 4.04 10X6/UL (ref 4–5.4)
SODIUM SERPL-SCNC: 142 MMOL/L (ref 136–145)
SP GR UR STRIP: 1 (ref 1–1.02)
UROBILINOGEN UR-MCNC: NORMAL MG/DL
WBC # BLD AUTO: 5 10X3/UL (ref 4.8–10.8)

## 2019-08-06 VITALS — SYSTOLIC BLOOD PRESSURE: 137 MMHG | DIASTOLIC BLOOD PRESSURE: 61 MMHG

## 2019-08-06 VITALS — DIASTOLIC BLOOD PRESSURE: 61 MMHG | SYSTOLIC BLOOD PRESSURE: 137 MMHG

## 2019-08-06 VITALS — SYSTOLIC BLOOD PRESSURE: 137 MMHG | DIASTOLIC BLOOD PRESSURE: 58 MMHG

## 2019-08-06 VITALS — DIASTOLIC BLOOD PRESSURE: 31 MMHG | SYSTOLIC BLOOD PRESSURE: 96 MMHG

## 2019-08-06 VITALS — SYSTOLIC BLOOD PRESSURE: 96 MMHG | DIASTOLIC BLOOD PRESSURE: 31 MMHG

## 2019-08-06 VITALS — SYSTOLIC BLOOD PRESSURE: 94 MMHG | DIASTOLIC BLOOD PRESSURE: 38 MMHG

## 2019-08-06 LAB
ANION GAP SERPL CALC-SCNC: 14.7 MMOL/L (ref 8–16)
APTT BLD: 28.5 SECONDS (ref 22.8–39.4)
BASOPHILS NFR BLD AUTO: 0.1 % (ref 0–2)
BUN SERPL-MCNC: 20 MG/DL (ref 7–18)
CALCIUM SERPL-MCNC: 8.2 MG/DL (ref 8.5–10.1)
CHLORIDE SERPL-SCNC: 108 MMOL/L (ref 98–107)
CO2 SERPL-SCNC: 20.7 MMOL/L (ref 21–32)
CREAT SERPL-MCNC: 1 MG/DL (ref 0.6–1.3)
EOSINOPHIL NFR BLD: 1.2 % (ref 0–7)
ERYTHROCYTE [DISTWIDTH] IN BLOOD BY AUTOMATED COUNT: 13 % (ref 11.5–14.5)
GLUCOSE SERPL-MCNC: 122 MG/DL (ref 74–106)
HCT VFR BLD CALC: 33.3 % (ref 36–48)
HGB BLD-MCNC: 11.1 G/DL (ref 12–16)
IMM GRANULOCYTES NFR BLD: 0.4 % (ref 0–5)
INR PPP: 1.09 (ref 0.85–1.17)
LYMPHOCYTES NFR BLD AUTO: 9.6 % (ref 15–50)
MCH RBC QN AUTO: 30.6 PG (ref 26–34)
MCHC RBC AUTO-ENTMCNC: 33.3 G/DL (ref 31–37)
MCV RBC: 91.7 FL (ref 80–100)
MONOCYTES NFR BLD: 1.6 % (ref 2–11)
NEUTROPHILS NFR BLD AUTO: 87.1 % (ref 40–80)
OSMOLALITY SERPL CALC.SUM OF ELEC: 281 MOSM/KG (ref 275–300)
PLATELET # BLD: 183 10X3/UL (ref 130–400)
PMV BLD AUTO: 9.9 FL (ref 7.4–10.4)
POTASSIUM SERPL-SCNC: 4.4 MMOL/L (ref 3.5–5.1)
PROTHROMBIN TIME: 13.6 SECONDS (ref 11.6–15)
RBC # BLD AUTO: 3.63 10X6/UL (ref 4–5.4)
SODIUM SERPL-SCNC: 139 MMOL/L (ref 136–145)
WBC # BLD AUTO: 11.9 10X3/UL (ref 4.8–10.8)

## 2019-08-06 PROCEDURE — 0SR90J9 REPLACEMENT OF RIGHT HIP JOINT WITH SYNTHETIC SUBSTITUTE, CEMENTED, OPEN APPROACH: ICD-10-PCS | Performed by: ORTHOPAEDIC SURGERY

## 2019-08-06 NOTE — NUR
JUST ARRIVED TO ROOM 2201. AWAKE AND ALERT EASILY TO AROUSED WHEN NAME IS
CALLED. RESP EVEN AND UNLABORED WITH NO DISTRESS NOTED. CAN EXPRESS NEEDS AND
WANTS. DRESSING CLEAN DRY AND INTACT TO RIGHT HIP WITH ICE PACK INTACT.
 AND C/L IN REACH AT BEDSIDE.

## 2019-08-06 NOTE — NUR
0812 DR. SAGASTUME HERE TO EVALUATE PT
0817 DR. SAGASTUME SAID TO PROCEED WITH GETTING PT READY FOR SURGERY.

## 2019-08-06 NOTE — NUR
1986 PT REPORTS HAVING CHEST PAIN LAST EVENING AND TOOK ONE NTG SL WITHOUT
RELIEF.  SHE SAID AFTER SHE STARTED PASSING FLATUS, THE PAIN IN HER CHEST
SUBSIDED.  PT REPORTS SHE HAS HAD A CARDIAC WORKUP 4 YEARS AGO. NO STENTS,NO
MI. HX OF PALPITATIONS.  STATES SHE HAD SOME PALPITATIONS LAST NIGHT AND THAT
WAS ANOTHER REASON FOR HER TO TAKE NTG.  DENIES CP AT PRESENT. NO PALPITATIONS
CURRENTLY.

## 2019-08-06 NOTE — NUR
0756  DR. SAGASTUME NOTIFIED OF PT'S REPORTING OF RECENT CHEST PAIN AND
PALPITATIONS. UPON FURTHER QUESTIONING, SHE REPORTS HAVING HEAVINESS IN CHEST
ON EXERTION AND FATIGUE.  DR SAGASTUME STATES HE WILL COME SEE PT.

## 2019-08-07 VITALS — SYSTOLIC BLOOD PRESSURE: 111 MMHG | DIASTOLIC BLOOD PRESSURE: 66 MMHG

## 2019-08-07 VITALS — DIASTOLIC BLOOD PRESSURE: 64 MMHG | SYSTOLIC BLOOD PRESSURE: 99 MMHG

## 2019-08-07 VITALS — DIASTOLIC BLOOD PRESSURE: 35 MMHG | SYSTOLIC BLOOD PRESSURE: 91 MMHG

## 2019-08-07 VITALS — DIASTOLIC BLOOD PRESSURE: 87 MMHG | SYSTOLIC BLOOD PRESSURE: 142 MMHG

## 2019-08-07 VITALS — DIASTOLIC BLOOD PRESSURE: 30 MMHG | SYSTOLIC BLOOD PRESSURE: 81 MMHG

## 2019-08-07 VITALS — SYSTOLIC BLOOD PRESSURE: 91 MMHG | DIASTOLIC BLOOD PRESSURE: 35 MMHG

## 2019-08-07 VITALS — SYSTOLIC BLOOD PRESSURE: 111 MMHG | DIASTOLIC BLOOD PRESSURE: 34 MMHG

## 2019-08-07 LAB
ALBUMIN SERPL-MCNC: 2.8 G/DL (ref 3.4–5)
ALP SERPL-CCNC: 80 U/L (ref 46–116)
ALT SERPL-CCNC: 20 U/L (ref 10–68)
ANION GAP SERPL CALC-SCNC: 15 MMOL/L (ref 8–16)
BILIRUB SERPL-MCNC: 0.29 MG/DL (ref 0.2–1.3)
BUN SERPL-MCNC: 22 MG/DL (ref 7–18)
CALCIUM SERPL-MCNC: 7.7 MG/DL (ref 8.5–10.1)
CHLORIDE SERPL-SCNC: 105 MMOL/L (ref 98–107)
CO2 SERPL-SCNC: 21.5 MMOL/L (ref 21–32)
CREAT SERPL-MCNC: 1.1 MG/DL (ref 0.6–1.3)
ERYTHROCYTE [DISTWIDTH] IN BLOOD BY AUTOMATED COUNT: 13.1 % (ref 11.5–14.5)
GLOBULIN SER-MCNC: 2.5 G/L
GLUCOSE SERPL-MCNC: 107 MG/DL (ref 74–106)
HCT VFR BLD CALC: 27.1 % (ref 36–48)
HGB BLD-MCNC: 9.1 G/DL (ref 12–16)
MCH RBC QN AUTO: 30.6 PG (ref 26–34)
MCHC RBC AUTO-ENTMCNC: 33.6 G/DL (ref 31–37)
MCV RBC: 91.2 FL (ref 80–100)
OSMOLALITY SERPL CALC.SUM OF ELEC: 276 MOSM/KG (ref 275–300)
PLATELET # BLD: 193 10X3/UL (ref 130–400)
PMV BLD AUTO: 10.1 FL (ref 7.4–10.4)
POTASSIUM SERPL-SCNC: 4.5 MMOL/L (ref 3.5–5.1)
PROT SERPL-MCNC: 5.3 G/DL (ref 6.4–8.2)
RBC # BLD AUTO: 2.97 10X6/UL (ref 4–5.4)
SODIUM SERPL-SCNC: 137 MMOL/L (ref 136–145)
WBC # BLD AUTO: 8.7 10X3/UL (ref 4.8–10.8)

## 2019-08-07 NOTE — NUR
PT RESTING IN BED WATCHING TV.  NO ACUTE DISTRESS NOTED AT THIS TIME.  O2 @ 2L
NC IN PLACE.  REPORTS PAIN 1/10 AT THIS TIME.  IV TO LEFT HAND WITH 1/2 NS @
50ML/HR INFUSING VIA PUMP.  SITE WITHOUT REDNESS OR EDEMA.  DRESSING C/D/I TO
RIGHT THIGH.  LEG IMMOBILIZER IN PLACE.  PULSE PALPABLE, EXTREMITY WARM TO
TOUCH, ABLE TO MOVE TOES.  PT DENIES FURTHER NEEDS AT THIS TIME.  CL WITHIN
REACH.  ENCOURAGED TO CALL WITH NEEDS.  CONTINUE POC

## 2019-08-07 NOTE — NUR
BLADDER SCAN PERFORMED, 0ML URINE SCANNED AT THIS TIME.  PT ASSISTED TO
BEDSIDE COMMODE VOIDED 250ML YELLOW URINE.  OBTAINED SPECIMEN PER ORDERS FOR
UA

## 2019-08-07 NOTE — NUR
PT RESTING IN BED WITH NO NEEDS AT THIS TIME IV IN PLACE AND PATEN TO LEFT
HAND WITH 1/2 NS AT 50 PER ORDERS KNEE IMMOBLIZER IN PLACE. DRESSING TO RIGHT
HIP IN PLACE CDI. LOPRESSER HELD THIS SHIFT FOR B/P OF 96/31. WATER IN REACH
REMAINS ON BEDREST AT THIS TIME.

## 2019-08-08 VITALS — SYSTOLIC BLOOD PRESSURE: 139 MMHG | DIASTOLIC BLOOD PRESSURE: 50 MMHG

## 2019-08-08 VITALS — DIASTOLIC BLOOD PRESSURE: 56 MMHG | SYSTOLIC BLOOD PRESSURE: 141 MMHG

## 2019-08-08 VITALS — SYSTOLIC BLOOD PRESSURE: 136 MMHG | DIASTOLIC BLOOD PRESSURE: 38 MMHG

## 2019-08-08 VITALS — DIASTOLIC BLOOD PRESSURE: 50 MMHG | SYSTOLIC BLOOD PRESSURE: 137 MMHG

## 2019-08-08 LAB
ALBUMIN SERPL-MCNC: 2.7 G/DL (ref 3.4–5)
ALP SERPL-CCNC: 81 U/L (ref 46–116)
ALT SERPL-CCNC: 29 U/L (ref 10–68)
ANION GAP SERPL CALC-SCNC: 12.3 MMOL/L (ref 8–16)
BASOPHILS NFR BLD AUTO: 0.1 % (ref 0–2)
BILIRUB SERPL-MCNC: 0.27 MG/DL (ref 0.2–1.3)
BUN SERPL-MCNC: 17 MG/DL (ref 7–18)
CALCIUM SERPL-MCNC: 7.8 MG/DL (ref 8.5–10.1)
CHLORIDE SERPL-SCNC: 108 MMOL/L (ref 98–107)
CO2 SERPL-SCNC: 24.7 MMOL/L (ref 21–32)
CREAT SERPL-MCNC: 1.2 MG/DL (ref 0.6–1.3)
EOSINOPHIL NFR BLD: 2.9 % (ref 0–7)
ERYTHROCYTE [DISTWIDTH] IN BLOOD BY AUTOMATED COUNT: 13.2 % (ref 11.5–14.5)
GLOBULIN SER-MCNC: 2.5 G/L
GLUCOSE SERPL-MCNC: 145 MG/DL (ref 74–106)
HCT VFR BLD CALC: 24.7 % (ref 36–48)
HGB BLD-MCNC: 8.3 G/DL (ref 12–16)
IMM GRANULOCYTES NFR BLD: 0.3 % (ref 0–5)
LYMPHOCYTES NFR BLD AUTO: 16.2 % (ref 15–50)
MCH RBC QN AUTO: 30.5 PG (ref 26–34)
MCHC RBC AUTO-ENTMCNC: 33.6 G/DL (ref 31–37)
MCV RBC: 90.8 FL (ref 80–100)
MONOCYTES NFR BLD: 6.9 % (ref 2–11)
NEUTROPHILS NFR BLD AUTO: 73.6 % (ref 40–80)
OSMOLALITY SERPL CALC.SUM OF ELEC: 285 MOSM/KG (ref 275–300)
PLATELET # BLD: 160 10X3/UL (ref 130–400)
PMV BLD AUTO: 9.7 FL (ref 7.4–10.4)
POTASSIUM SERPL-SCNC: 4 MMOL/L (ref 3.5–5.1)
PROT SERPL-MCNC: 5.2 G/DL (ref 6.4–8.2)
RBC # BLD AUTO: 2.72 10X6/UL (ref 4–5.4)
SODIUM SERPL-SCNC: 141 MMOL/L (ref 136–145)
WBC # BLD AUTO: 7.3 10X3/UL (ref 4.8–10.8)

## 2019-08-08 NOTE — NUR
ALERT RESTING IN BED, SWELLING AND BRUSING NOTED TO RIGHT HIP, DRESSING D/I,
SEE SHIFT ASSESSMENT, CALL LIGHT IN REACH

## 2019-08-08 NOTE — NUR
ALERT SITTING UP IN BED, HAS BEEN UP AMBULATING IN HALLWAY, ABD TENDER WITH
STERI STRIPS IN PLACE X 5 SITES, NO DRAINAGE NOTED, SEE SHIFT ASSESSMENT, CALL
LIGHT IN REACH

## 2019-08-09 VITALS — DIASTOLIC BLOOD PRESSURE: 49 MMHG | SYSTOLIC BLOOD PRESSURE: 136 MMHG

## 2019-08-09 VITALS — DIASTOLIC BLOOD PRESSURE: 43 MMHG | SYSTOLIC BLOOD PRESSURE: 120 MMHG

## 2019-08-09 VITALS — DIASTOLIC BLOOD PRESSURE: 41 MMHG | SYSTOLIC BLOOD PRESSURE: 128 MMHG

## 2019-08-09 VITALS — DIASTOLIC BLOOD PRESSURE: 42 MMHG | SYSTOLIC BLOOD PRESSURE: 121 MMHG

## 2019-08-09 LAB
ALBUMIN SERPL-MCNC: 2.7 G/DL (ref 3.4–5)
ALP SERPL-CCNC: 88 U/L (ref 46–116)
ALT SERPL-CCNC: 38 U/L (ref 10–68)
ANION GAP SERPL CALC-SCNC: 13.9 MMOL/L (ref 8–16)
BASOPHILS NFR BLD AUTO: 0.2 % (ref 0–2)
BILIRUB SERPL-MCNC: 0.58 MG/DL (ref 0.2–1.3)
BUN SERPL-MCNC: 15 MG/DL (ref 7–18)
CALCIUM SERPL-MCNC: 8.6 MG/DL (ref 8.5–10.1)
CHLORIDE SERPL-SCNC: 107 MMOL/L (ref 98–107)
CO2 SERPL-SCNC: 24.1 MMOL/L (ref 21–32)
CREAT SERPL-MCNC: 1.1 MG/DL (ref 0.6–1.3)
EOSINOPHIL NFR BLD: 5.6 % (ref 0–7)
ERYTHROCYTE [DISTWIDTH] IN BLOOD BY AUTOMATED COUNT: 13.3 % (ref 11.5–14.5)
GLOBULIN SER-MCNC: 3 G/L
GLUCOSE SERPL-MCNC: 104 MG/DL (ref 74–106)
HCT VFR BLD CALC: 25.1 % (ref 36–48)
HGB BLD-MCNC: 8.4 G/DL (ref 12–16)
IMM GRANULOCYTES NFR BLD: 0.3 % (ref 0–5)
LYMPHOCYTES NFR BLD AUTO: 20.1 % (ref 15–50)
MCH RBC QN AUTO: 30.7 PG (ref 26–34)
MCHC RBC AUTO-ENTMCNC: 33.5 G/DL (ref 31–37)
MCV RBC: 91.6 FL (ref 80–100)
MONOCYTES NFR BLD: 7.5 % (ref 2–11)
NEUTROPHILS NFR BLD AUTO: 66.3 % (ref 40–80)
OSMOLALITY SERPL CALC.SUM OF ELEC: 281 MOSM/KG (ref 275–300)
PLATELET # BLD: 168 10X3/UL (ref 130–400)
PMV BLD AUTO: 10 FL (ref 7.4–10.4)
POTASSIUM SERPL-SCNC: 4 MMOL/L (ref 3.5–5.1)
PROT SERPL-MCNC: 5.7 G/DL (ref 6.4–8.2)
RBC # BLD AUTO: 2.74 10X6/UL (ref 4–5.4)
SODIUM SERPL-SCNC: 141 MMOL/L (ref 136–145)
WBC # BLD AUTO: 6.4 10X3/UL (ref 4.8–10.8)

## 2019-08-09 NOTE — MORECARE
CASE MANAGEMENT DISCHARGE SUMMARY
 
 
PATIENT: SUMEET MCGREGOR SHARP             UNIT: [id number]
ACCOUNT#: [id number]                       ADM DATE: 19
AGE: 59     : 60  SEX: F            ROOM/BED: D.2201    
AUTHOR: MARY JORDAN                             PHYSICIAN:                               
 
REFERRING PHYSICIAN: BERNADETTE FREY DO         
DATE OF SERVICE: 19
Discharge Plan
 
 
Patient Name: SUMEET MCGREGOR
Facility: [facility]:[place]
Encounter #: [id number]
Medical Record #: [id number]
: 1960
Planned Disposition: Home Health Service
Anticipated Discharge Date: 
 
Discharge Date: 
Expected LOS: 
Initial Reviewer: [email or web address]
Initial Review Date: 2019
Generated: 19   9:00 am 
Comments
 
DCP- Discharge Planning
 
Updated by [email or web address]: Charlene Shea on 19   6:46 am CT
PATIENT PHYSICAL ADDRESS IS   
[address] [place] [address]  
[phone]
DCP- Discharge Planning
 
Updated by [id number]: Charlene Shea on 19   6:44 am CT
Patient Name: SUMEET MCGREGOR                                     
Admission Status: Elective   
Accout number: [id number]                              
Admission Date: 2019   
: 1960                                                        
Admission Diagnosis:UNILATERAL PRIMARY OSTEOARTHRITIS, RIGHT KNEE   
Attending: BERNADETTE FREY                                                
Current LOS:  3   
  
Anticipated DC Date:    
Planned Disposition: Home Health Service   
Primary Insurance: [place]   
 
  
  
Discharge Planning Comments:   
CM met with patient to complete initial dc planning assessment.  CM educated 
patient on the CM role and verbal consent given by patient to complete 
assessment.   Patient lives at home with a roommate where she is independent 
with her care.  At discharge patient plans to return home  and feels this is 
a safe discharge.  Her Roommate will be her  home. CM discussed 
availability of home health, rehab services, and medical equipment. She would 
like home health at [facility] with [facility]. She has a walker at home. I will send 
referral to [facility] .  Patient denied known discharge needs at this time. CM 
will continue to follow and will assist as needed with dc plans/needs.    
  
  
  
  
  
: Charlene Shea
 DCPIA - Discharge Planning Initial Assessment
 
Updated by [id number]: Charlene Shea on 19   7:43 am
*  Is the patient Alert and Oriented?
Yes
*  How many steps to enter\exit or inside your home?
 
*  PCP
[facility]
*  Pharmacy
[facility] [address]
*  Preadmission Environment
Home with Family
*  ADLs
Independent
*  Equipment
Cane
Walker
*  List name and contact numbers for known caregivers / representatives who 
currently or will assist patient after discharge:
DARREN OROPEZA   [phone]
 
*  Verbal permission to speak to the caregivers and representatives has been 
obtained from the patient.
N/A
*  Community resources currently utilized
None
*  Additional services required to return to the preadmission environment?
Yes
*  Can the patient safely return to the preadmission environment?
Yes
*  Has this patient been hospitalized within the prior 30 days at any 
hospital?
No
 
 
External Providers
External Provider: [facility]
 
Next Contact Date: 
Service Request Date: 
Service Type: 
Resolution: 
 
Reviewer: 
Comments: 
 
 
 
 
Coverage Notice
 
Reviewer: [email or web address] Dwayne Shea
 
Notice Issued Date-Time: 2019   7:30
Notice Type: Patient Choice Letter
 
Notice Delivered To: Patient
Relationship to Patient: 
Representative Name: 
 
Delivery Method: HAND - Hand Delivered
Mirna Days:
Prior Verbal Notification: 
 
Recipient Understood Notice: Yes
Recipient Signature: Yes
Med Rec Note Co-signed by Attending:
 
Coverage Notice Comment:  [facility]
 
Last DP export: 19   6:47 am
Patient Name: SUMEET MCGREGOR
 
Encounter #: [id number]
Page [id number]
 
 
 
 
 
Electronically Signed by MARY JORDAN on 19 at 0800
 
 
 
 
 
 
**All edits/amendments must be made on the electronic document**
 
DICTATION DATE: 19     : GERARD  19 0759     
RPT#: [id number]                                DC DATE:        
                                               STATUS: ADM IN  
[facility]
[address] [place] [address]
***END OF REPORT***

## 2019-08-09 NOTE — MORECARE
CASE MANAGEMENT DISCHARGE SUMMARY
 
 
PATIENT: SUMEET MCGREGOR SHARP             UNIT: [id number]
ACCOUNT#: [id number]                       ADM DATE: 19
AGE: 59     : 60  SEX: F            ROOM/BED: D.2201    
AUTHOR: MARY JORDAN                             PHYSICIAN:                               
 
REFERRING PHYSICIAN: BERNADETTE FREY DO         
DATE OF SERVICE: 19
Discharge Plan
 
 
Patient Name: SUMEET MCGREGOR
Facility: [facility]:[place]
Encounter #: [id number]
Medical Record #: [id number]
: 1960
Planned Disposition: Home Health Service
Anticipated Discharge Date: 
 
Discharge Date: 
Expected LOS: 
Initial Reviewer: [id number]
Initial Review Date: 2019
Generated: 19   8:47 am 
Comments
 
DCP- Discharge Planning
 
Updated by [id number]: Charlene Shea on 19   6:46 am CT
PATIENT PHYSICAL ADDRESS IS   
[address] [place] [address]  
[phone]
DCP- Discharge Planning
 
Updated by [id number]: Charlene Shea on 19   6:44 am CT
Patient Name: SUMEET MCGREGOR                                     
Admission Status: Elective   
Accout number: [id number]                              
Admission Date: 2019   
: 1960                                                        
Admission Diagnosis:UNILATERAL PRIMARY OSTEOARTHRITIS, RIGHT KNEE   
Attending: BERNADETTE FREY                                                
Current LOS:  3   
  
Anticipated DC Date:    
Planned Disposition: Home Health Service   
Primary Insurance: [place]   
 
  
  
Discharge Planning Comments:   
CM met with patient to complete initial dc planning assessment.  CM educated 
patient on the CM role and verbal consent given by patient to complete 
assessment.   Patient lives at home with a roommate where she is independent 
with her care.  At discharge patient plans to return home  and feels this is 
a safe discharge.  Her Roommate will be her  home. CM discussed 
availability of home health, rehab services, and medical equipment. She would 
like home health at [facility] with [facility]. She has a walker at home. I will send 
referral to [facility] .  Patient denied known discharge needs at this time. CM 
will continue to follow and will assist as needed with dc plans/needs.    
  
  
  
  
  
: Charlene Shea
 DCPIA - Discharge Planning Initial Assessment
 
Updated by [id number]: Charlene Shea on 19   7:43 am
*  Is the patient Alert and Oriented?
Yes
*  How many steps to enter\exit or inside your home?
 
*  PCP
[facility]
*  Pharmacy
[facility] [address]
*  Preadmission Environment
Home with Family
*  ADLs
Independent
*  Equipment
Cane
Walker
*  List name and contact numbers for known caregivers / representatives who 
currently or will assist patient after discharge:
DARREN OROPEZA   [phone]
 
*  Verbal permission to speak to the caregivers and representatives has been 
obtained from the patient.
N/A
*  Community resources currently utilized
None
*  Additional services required to return to the preadmission environment?
Yes
*  Can the patient safely return to the preadmission environment?
Yes
*  Has this patient been hospitalized within the prior 30 days at any 
hospital?
No
 
 
 
 
 
 
Coverage Notice
 
Reviewer: [email or web address] Dwayne Shea
 
Notice Issued Date-Time: 2019   7:30
Notice Type: Patient Choice Letter
 
Notice Delivered To: Patient
Relationship to Patient: 
Representative Name: 
 
Delivery Method: HAND - Hand Delivered
Mirna Days:
Prior Verbal Notification: 
 
Recipient Understood Notice: Yes
Recipient Signature: Yes
Med Rec Note Co-signed by Attending:
 
Coverage Notice Comment:  [facility]
Patient Name: SUMEET MCGREGOR
Encounter #: [id number]
Page [id number]
 
 
 
 
 
Electronically Signed by MARY JORDAN on 19 at 0747
 
 
 
 
 
 
**All edits/amendments must be made on the electronic document**
 
DICTATION DATE: 19     : GERARD  19     
RPT#: [id number]                                DC DATE:        
                                               STATUS: ADM IN  
[facility]
[address] [place] [address]
***END OF REPORT***

## 2019-08-09 NOTE — NUR
DRESSING TO RIGHT HIP CHANGED PRIOR TO DISCHARGE HOME. AMBULATORY WITH
FAMILY.DISCHARGE INSTRUCTIONS GIVEN BOTH VERBALLY AND WRITTEN. ALL QUESTIONS
ANSWERED. PATIENT VERBALIZED UNDERSTANDING OF SAME. NEEDED PRESCRIPTIONS GIVEN
TO PATIENT. SL TO LEFT HAND D/C WITH CATHETER INTACT. ALL BELONGINGS WITH
PATIENT.

## 2019-08-09 NOTE — NUR
AWAKE AND ALERT. ORIENTED X3. NO C/O AT THIS TIME. LUNGS ARE CLEAR
BILATERALLY, NO COUGH NOTED. SKIN IS INTACT WTIHOUT REDNESS EXCEPT INCISION TO
RIGHT HIP WHICH HAS A DRY INTACT DRESSING IN PLACE. SL TO LEFT HAND IS PATENT
WITHOUT REDNESS AT INSERTION SITE. DENIES NEEDS.

## 2019-08-09 NOTE — MORECARE
CASE MANAGEMENT DISCHARGE SUMMARY
 
 
PATIENT: SUMEET MCGREGOR SHARP             UNIT: [id number]
ACCOUNT#: [id number]                       ADM DATE: 19
AGE: 59     : 60  SEX: F            ROOM/BED: D.2201    
AUTHOR: MARY JORDAN                             PHYSICIAN:                               
 
REFERRING PHYSICIAN: BERNADETTE FREY DO         
DATE OF SERVICE: 19
Discharge Plan
 
 
Patient Name: SUMEET MCGREGOR
Facility: [facility]:[place]
Encounter #: [id number]
Medical Record #: [id number]
: 1960
Planned Disposition: Home Health Service
Anticipated Discharge Date: 
 
Discharge Date: 
Expected LOS: 
Initial Reviewer: [email or web address]
Initial Review Date: 2019
Generated: 19  12:34 pm 
Comments
 
DCP- Discharge Planning
 
Updated by [email or web address]: Charlene Shea on 19  10:31 am CT
PATIENT DISCHARGING HOME TODAY WITH [facility], THEY WILL ACCEPT THE 
PATIENT PER [facility]. CM TO FOLLOW AS NEEDED
DCP- Discharge Planning
 
Updated by [id number]: Charlene Shea on 19   6:46 am CT
PATIENT PHYSICAL ADDRESS IS   
[address] [place] [address]  
[phone]
DCP- Discharge Planning
 
Updated by [id number]: Charlene Shea on 19   6:44 am CT
Patient Name: SUMEET MCGREGOR                                     
Admission Status: Elective   
Accout number: [id number]                              
Admission Date: 2019   
: 1960                                                        
Admission Diagnosis:UNILATERAL PRIMARY OSTEOARTHRITIS, RIGHT KNEE   
Attending: BERNADETTE FREY                                                
 
Current LOS:  3   
  
Anticipated DC Date:    
Planned Disposition: Home Health Service   
Primary Insurance: [place]   
  
  
Discharge Planning Comments:   
CM met with patient to complete initial dc planning assessment.  CM educated 
patient on the CM role and verbal consent given by patient to complete 
assessment.   Patient lives at home with a roommate where she is independent 
with her care.  At discharge patient plans to return home  and feels this is 
a safe discharge.  Her Roommate will be her  home. CM discussed 
availability of home health, rehab services, and medical equipment. She would 
like [place] health [place]. She has a walker at home. I will send 
referral to [facility] .  Patient denied known discharge needs at this time. CM 
will continue to follow and will assist as needed with dc plans/needs.    
  
  
  
  
  
: Charlene Shea
 DCPIA - Discharge Planning Initial Assessment
 
Updated by [id number]: Charlene Shea on 19   7:43 am
*  Is the patient Alert and Oriented?
Yes
*  How many steps to enter\exit or inside your home?
 
*  PCP
[facility]
*  Pharmacy
[facility] [address]
*  Preadmission Environment
Home with Family
*  ADLs
Independent
*  Equipment
Cane
Walker
*  List name and contact numbers for known caregivers / representatives who 
currently or will assist patient after discharge:
DARREN DIEGOTZ   [phone]
 
*  Verbal permission to speak to the caregivers and representatives has been 
obtained from the patient.
N/A
*  Community resources currently utilized
None
*  Additional services required to return to the preadmission environment?
Yes
 
*  Can the patient safely return to the preadmission environment?
Yes
*  Has this patient been hospitalized within the prior 30 days at any 
hospital?
No
 
 
 
 
 
Coverage Notice
 
Reviewer: [email or web address] - Charlene Shea
 
Notice Issued Date-Time: 2019   7:30
Notice Type: Patient Choice Letter
 
Notice Delivered To: Patient
Relationship to Patient: 
Representative Name: 
 
Delivery Method: HAND - Hand Delivered
Mirna Days:
Prior Verbal Notification: 
 
Recipient Understood Notice: Yes
Recipient Signature: Yes
Med Rec Note Co-signed by Attending:
 
Coverage Notice Comment:  [facility]
 
Last DP export: 19   7:00 am
Patient Name: SUMEET MCGREGOR
Encounter #: [id number]
Page [id number]
 
 
 
 
 
Electronically Signed by MARY JORDAN on 19 at 1135
 
 
 
 
 
 
**All edits/amendments must be made on the electronic document**
 
DICTATION DATE: 19 1134     : GERARD  19 1134     
RPT#: [id number]                                DC DATE:        
                                               STATUS: ADM IN  
[facility]
[address] [place] [address]
***END OF REPORT***

## 2019-08-12 NOTE — MORECARE
CASE MANAGEMENT DISCHARGE SUMMARY
 
 
PATIENT: SUMEET MCGREGOR SHARP             UNIT: [id number]
ACCOUNT#: [id number]                       ADM DATE: 19
AGE: 59     : 60  SEX: F            ROOM/BED: D.2201    
AUTHOR: JULIAN,DOC                             PHYSICIAN:                               
 
REFERRING PHYSICIAN: BERNADETTE FREY DO         
DATE OF SERVICE: 19
Discharge Plan
 
 
Patient Name: SUMEET MCGREGOR
Facility: [facility]
Encounter #: [id number]
Medical Record #: [id number]
: 1960
Planned Disposition: Home Health Service
Anticipated Discharge Date: 
 
Discharge Date: 2019
Expected LOS: 
Initial Reviewer: [id number]
Initial Review Date: 2019
Generated: 19   3:14 pm 
Comments
 
DCP- Discharge Planning
 
Updated by [email or web address]: Charlene Shea on 19  10:31 am CT
PATIENT DISCHARGING HOME TODAY WITH [facility], THEY WILL ACCEPT THE 
PATIENT PER [facility]. CM TO FOLLOW AS NEEDED
DCP- Discharge Planning
 
Updated by [email or web address]: Charlene Shea on 19   6:46 am CT
PATIENT PHYSICAL ADDRESS IS   
[address] [place] [address]  
[phone]
DCP- Discharge Planning
 
Updated by [id number]: Charlene Shea on 19   6:44 am CT
Patient Name: SUMEET MCGREGOR                                     
Admission Status: Elective   
Accout number: [id number]                              
Admission Date: 2019   
: 1960                                                        
Admission Diagnosis:UNILATERAL PRIMARY OSTEOARTHRITIS, RIGHT KNEE   
Attending: BERNADETTE FREY                                                
 
Current LOS:  3   
  
Anticipated DC Date:    
Planned Disposition: Home Health Service   
Primary Insurance: [place]   
  
  
Discharge Planning Comments:   
CM met with patient to complete initial dc planning assessment.  CM educated 
patient on the CM role and verbal consent given by patient to complete 
assessment.   Patient lives at home with a roommate where she is independent 
with her care.  At discharge patient plans to return home  and feels this is 
a safe discharge.  Her Roommate will be her  home. CM discussed 
availability of home health, rehab services, and medical equipment. She would 
like [place] health at [place] [place] [facility]. She has a walker at home. I will send 
referral to [facility] .  Patient denied known discharge needs at this time. CM 
will continue to follow and will assist as needed with dc plans/needs.    
  
  
  
  
  
: Charlene Shea
 DCPIA - Discharge Planning Initial Assessment
 
Updated by [email or web address]: Charlene Shea on 19   7:43 am
*  Is the patient Alert and Oriented?
Yes
*  How many steps to enter\exit or inside your home?
 
*  [facility]P
[facility]
*  Pharmacy
[facility] ON [address]
*  Preadmission Environment
Home with Family
*  ADLs
Independent
*  Equipment
Cane
Walker
*  List name and contact numbers for known caregivers / representatives who 
currently or will assist patient after discharge:
DARREN ROOPEZA   [phone]
 
*  Verbal permission to speak to the caregivers and representatives has been 
obtained from the patient.
N/A
*  Community resources currently utilized
None
*  Additional services required to return to the preadmission environment?
Yes
 
*  Can the patient safely return to the preadmission environment?
Yes
*  Has this patient been hospitalized within the prior 30 days at any 
hospital?
No
 
 
 
 
 
Coverage Notice
 
Reviewer: [email or web address] - Charlene Shea
 
Notice Issued Date-Time: 2019   7:30
Notice Type: Patient Choice Letter
 
Notice Delivered To: Patient
Relationship to Patient: 
Representative Name: 
 
Delivery Method: HAND - Hand Delivered
Mirna Days:
Prior Verbal Notification: 
 
Recipient Understood Notice: Yes
Recipient Signature: Yes
Med Rec Note Co-signed by Attending:
 
Coverage Notice Comment:  [facility]
 
Last DP export: 19  10:35 am
Patient Name: SUMEET MCGREGOR
Encounter #: [id number]
Page [id number]
 
 
 
 
 
Electronically Signed by MARY JORDAN on 19 at 1414
 
 
 
 
 
 
**All edits/amendments must be made on the electronic document**
 
DICTATION DATE: 19     : GERARD  19 1413     
RPT#: [id number]                                DC DATE:19
                                               STATUS: DIS IN  
[facility]
[address] [place] [address]
***END OF REPORT***

## 2019-08-20 NOTE — OP
PATIENT NAME:  SUMEET MCGREGOR                   MEDICAL RECORD: [id number]
:60                                             LOCATION:D.MS GTZ
                                                         ADMISSION DATE:19
SURGEON:  LORNE FREY DO         
 
 
DATE OF OPERATION:  2019
 
PROCEDURE PERFORMED:  Right total hip arthroplasty.
 
PREOPERATIVE DIAGNOSIS:  Right hip osteoarthritis.
 
POSTOPERATIVE DIAGNOSIS:  Right hip osteoarthritis.
 
INDICATIONS:  Ms. Soto is a 59-year-old female who had her left hip replaced
within the last year.  Her right hip had been hurting her as well.  She wanted
it replaced as well.  She was tired of dealing with the pain and affecting her
activities of daily living.  She was aware of the risks including infection,
bleeding, damage to nerves and vessels, need for further surgery, fracture, and
continued pain and she signed the consent.
 
SURGEON:  Lorne Frey DO
 
ASSISTANT:  I was assisted by Sai Lam, advanced nurse practitioner.
 
DESCRIPTION OF PROCEDURE:  The patient received a block per anesthesia in the
preoperative area, was taken to the operative suite, laid in the supine
position, sedated and intubated and then moved over to the position of the Sharon
table.  When she was on the Sharon table, the right hip was prepped and draped in
sterile fashion.  Timeout was performed, everyone was in agreement as to the
correct side, site, patient and procedure.  Incision then began over the tensor
fascia giovanni muscle.  Careful dissection was made down to the tensor fascia giovanni.
 The tensor fascia giovanni fascia was incised and the fascia was taken anteriorly,
the muscle belly posterior, opened up the rectus and fascia was then incised. 
Rectus was taken medially and the tensor fascia giovanni laterally.  The capsule was
then exposed.  Hohmanns were placed on either side of the femoral neck.  Capsule
was opened.  Prior to this, the ascending branch of the lateral femoral
circumflex was encountered, tied off and coagulated with an Aquamantys.  Once
the capsule was exposed, the capsule was opened and tagged with #2 Ethibond. 
Hohmanns were then moved inside the capsule around the neck and the neck cut was
made.  Once the neck cut was made, the head was removed and then a Charnley was
placed.  The acetabulum was exposed and the labrum was removed from around the
acetabulum as well as the pulvinar in the anterior portion.  Reaming then began
from a 38, went up to a 46 and a 46 cup was impacted into place and then the
liner was placed and impacted.  The femur was then exposed and releases were
done in order to get the femur exposed.  First the canal finder was used and
then the cookie cutter was used to get more lateral and broaching began with a
4, the 4 fit very tightly, went to a 5 and the 5 was very tight as well.  She
has a very small canal and her head was small too.  The 5 fit and then we
trialed a -6 neck, this seemed to be adequate length compared to the other side.
 This was confirmed on x-ray.  This was then removed and irrigation was done. 
The stem was then placed and the head was put on and the hip was reduced. 
X-rays were taken.  The hip seemed to be in good position.  No fractures were
seen in the femur and had good lengths compared to the other side.  The wound
was then thoroughly irrigated.  Tobramycin and vancomycin powder was placed in
the wound as well as Surgicel powder and the tensor fascia giovanni fascia was
closed with #1 Vicryl, first in a figure-of-eight and then a running locking
stitch by MARIBEL Munguia.  Once that was closed, the skin was closed with 2-0
 
 
 
OPERATIVE REPORT                               [id number]    SUMEET MCGREGOR 
 
 
Vicryl in inverted interrupted fashion, 4-0 Monocryl ran on the skin, and Prineo
glue on the skin. 
The patient was then awakened and taken to recovery in stable condition.  Blood
loss was 250 mL proximally.
 
COMPLICATIONS:  None.
 
TRANSINT:[id number] Voice Confirmation ID: [id number] DOCUMENT ID: [id number]
2019 Edited for betina Lam.
                                           
                                           LORNE FREY DO         
 
 
 
Electronically Signed by LORNE EDWARD on 19 at 1037
 
 
 
 
 
 
 
 
 
 
 
 
 
 
 
 
 
 
 
 
 
 
 
 
 
 
 
 
 
 
 
 
CC:                                                             [id number]
DICTATION DATE: 19 1130     :     19 1156      DIS IN  
                                                                      19
[place] [id number]

## 2019-08-28 ENCOUNTER — HOSPITAL ENCOUNTER (INPATIENT)
Dept: HOSPITAL 84 - D.MS | Age: 59
LOS: 8 days | Discharge: HOME HEALTH | DRG: 466 | End: 2019-09-05
Attending: ORTHOPAEDIC SURGERY | Admitting: ORTHOPAEDIC SURGERY
Payer: MEDICAID

## 2019-08-28 VITALS — SYSTOLIC BLOOD PRESSURE: 112 MMHG | DIASTOLIC BLOOD PRESSURE: 38 MMHG

## 2019-08-28 VITALS — DIASTOLIC BLOOD PRESSURE: 30 MMHG | SYSTOLIC BLOOD PRESSURE: 84 MMHG

## 2019-08-28 VITALS — SYSTOLIC BLOOD PRESSURE: 81 MMHG | DIASTOLIC BLOOD PRESSURE: 30 MMHG

## 2019-08-28 VITALS — SYSTOLIC BLOOD PRESSURE: 103 MMHG | DIASTOLIC BLOOD PRESSURE: 41 MMHG

## 2019-08-28 VITALS — DIASTOLIC BLOOD PRESSURE: 43 MMHG | SYSTOLIC BLOOD PRESSURE: 112 MMHG

## 2019-08-28 VITALS — SYSTOLIC BLOOD PRESSURE: 103 MMHG | DIASTOLIC BLOOD PRESSURE: 37 MMHG

## 2019-08-28 VITALS — SYSTOLIC BLOOD PRESSURE: 156 MMHG | DIASTOLIC BLOOD PRESSURE: 69 MMHG

## 2019-08-28 VITALS — BODY MASS INDEX: 39.79 KG/M2 | WEIGHT: 200 LBS | HEIGHT: 59.5 IN | BODY MASS INDEX: 39.79 KG/M2

## 2019-08-28 VITALS — SYSTOLIC BLOOD PRESSURE: 138 MMHG | DIASTOLIC BLOOD PRESSURE: 42 MMHG

## 2019-08-28 VITALS — SYSTOLIC BLOOD PRESSURE: 123 MMHG | DIASTOLIC BLOOD PRESSURE: 33 MMHG

## 2019-08-28 VITALS — SYSTOLIC BLOOD PRESSURE: 95 MMHG | DIASTOLIC BLOOD PRESSURE: 39 MMHG

## 2019-08-28 VITALS — DIASTOLIC BLOOD PRESSURE: 34 MMHG | SYSTOLIC BLOOD PRESSURE: 86 MMHG

## 2019-08-28 VITALS — DIASTOLIC BLOOD PRESSURE: 82 MMHG | SYSTOLIC BLOOD PRESSURE: 164 MMHG

## 2019-08-28 VITALS — SYSTOLIC BLOOD PRESSURE: 78 MMHG | DIASTOLIC BLOOD PRESSURE: 32 MMHG

## 2019-08-28 VITALS — DIASTOLIC BLOOD PRESSURE: 37 MMHG | SYSTOLIC BLOOD PRESSURE: 107 MMHG

## 2019-08-28 DIAGNOSIS — I10: ICD-10-CM

## 2019-08-28 DIAGNOSIS — T84.51XA: Primary | ICD-10-CM

## 2019-08-28 DIAGNOSIS — F41.9: ICD-10-CM

## 2019-08-28 DIAGNOSIS — E83.42: ICD-10-CM

## 2019-08-28 DIAGNOSIS — N17.0: ICD-10-CM

## 2019-08-28 DIAGNOSIS — D62: ICD-10-CM

## 2019-08-28 DIAGNOSIS — I95.9: ICD-10-CM

## 2019-08-28 DIAGNOSIS — M19.90: ICD-10-CM

## 2019-08-28 DIAGNOSIS — R33.9: ICD-10-CM

## 2019-08-28 LAB
BASOPHILS NFR BLD AUTO: 0.1 % (ref 0–2)
EOSINOPHIL NFR BLD: 4.4 % (ref 0–7)
ERYTHROCYTE [DISTWIDTH] IN BLOOD BY AUTOMATED COUNT: 13.6 % (ref 11.5–14.5)
ERYTHROCYTE [SEDIMENTATION RATE] IN BLOOD: 74 MM/HR (ref 0–30)
HCT VFR BLD CALC: 27.4 % (ref 36–48)
HGB BLD-MCNC: 9.1 G/DL (ref 12–16)
IMM GRANULOCYTES NFR BLD: 0.1 % (ref 0–5)
LYMPHOCYTES NFR BLD AUTO: 11.1 % (ref 15–50)
MCH RBC QN AUTO: 29.7 PG (ref 26–34)
MCHC RBC AUTO-ENTMCNC: 33.2 G/DL (ref 31–37)
MCV RBC: 89.5 FL (ref 80–100)
MONOCYTES NFR BLD: 7.6 % (ref 2–11)
NEUTROPHILS NFR BLD AUTO: 76.7 % (ref 40–80)
PLATELET # BLD: 238 10X3/UL (ref 130–400)
PMV BLD AUTO: 9.2 FL (ref 7.4–10.4)
RBC # BLD AUTO: 3.06 10X6/UL (ref 4–5.4)
WBC # BLD AUTO: 8.2 10X3/UL (ref 4.8–10.8)

## 2019-08-28 PROCEDURE — 0SRR0J9 REPLACEMENT OF RIGHT HIP JOINT, FEMORAL SURFACE WITH SYNTHETIC SUBSTITUTE, CEMENTED, OPEN APPROACH: ICD-10-PCS | Performed by: ORTHOPAEDIC SURGERY

## 2019-08-28 PROCEDURE — 0SPR0JZ REMOVAL OF SYNTHETIC SUBSTITUTE FROM RIGHT HIP JOINT, FEMORAL SURFACE, OPEN APPROACH: ICD-10-PCS | Performed by: ORTHOPAEDIC SURGERY

## 2019-08-28 NOTE — NUR
RECEIVED PT BACK TO ROOM 2231. PT A/O X4, RESP EVEN AND NONLABORED ON RA.
WOUND VAC TO RT HIP. RT WRIST IV NOTED. PT DENIES ANY PAIN, CALL LIGHT IN
REACH,NAD NOTED, WILL CONTINUE TO MONITOR.

## 2019-08-28 NOTE — NUR
NOTIFIED ANESTHESIA THAT PT HAS NO IV AT THIS TIME. WAS INFORMED THAT
ANESTHESIA WILL START ONE IN OR. CONSENTS SIGNED AND PLACED ON CHART. IV
FLUIDS READY.

## 2019-08-28 NOTE — NUR
PT SITTING UP IN BED WITHOUT DISTRESS, ALERT AND ORIENTED. STATES NO PAIN,
JUST SORENESS AT THIS TIME. RIGHT HIP INCISION WITH WOUND VAC, DRESSING CDI.
WOUND VAC ON AND SEALED. HIP IS RED, SWOLLEN AND WARM TO TOUCH AROUND SITE. IV
RIGHT WRIST INFUSING 1/2NS @ 50. ASSISTED PT UP TO BEDSIDE COMMODE WITH
MINIMAL ASSIST. VOIDED DARK YELLOW URINE. ASSISTED PT BACK TO BED AND PLACED
SCDS ON PT. GAVE PT INCENTIVE SPIROMETER AND PROVIDED EDUCATION ON USE,
VERBALIZED UNDERSTANDING. NO OTHER NEEDS OR COMPLAINTS AT THIS TIME. CL IN
REACH, WILL CTM

## 2019-08-28 NOTE — NUR
RECEIVED PT TO ROOM 2231 VIA WHEELCHAIR, PT WAS ABLE TO AMBULATE FROM
WHEELCHAIR TO BED. PT A/O X4, RESP EVEN AND NONLABORED ON RA. REDNESS NOTED TO
RT HIP AND WARM TO TOUCH. ORIENTED PT TO ROOM AND CALL LIGHT. WILL ASSESS PT
AND START PLAN OF CARE.

## 2019-08-29 VITALS — SYSTOLIC BLOOD PRESSURE: 100 MMHG | DIASTOLIC BLOOD PRESSURE: 31 MMHG

## 2019-08-29 VITALS — SYSTOLIC BLOOD PRESSURE: 102 MMHG | DIASTOLIC BLOOD PRESSURE: 39 MMHG

## 2019-08-29 VITALS — SYSTOLIC BLOOD PRESSURE: 89 MMHG | DIASTOLIC BLOOD PRESSURE: 38 MMHG

## 2019-08-29 VITALS — SYSTOLIC BLOOD PRESSURE: 98 MMHG | DIASTOLIC BLOOD PRESSURE: 38 MMHG

## 2019-08-29 VITALS — SYSTOLIC BLOOD PRESSURE: 112 MMHG | DIASTOLIC BLOOD PRESSURE: 44 MMHG

## 2019-08-29 VITALS — SYSTOLIC BLOOD PRESSURE: 100 MMHG | DIASTOLIC BLOOD PRESSURE: 41 MMHG

## 2019-08-29 VITALS — SYSTOLIC BLOOD PRESSURE: 93 MMHG | DIASTOLIC BLOOD PRESSURE: 41 MMHG

## 2019-08-29 VITALS — SYSTOLIC BLOOD PRESSURE: 107 MMHG | DIASTOLIC BLOOD PRESSURE: 46 MMHG

## 2019-08-29 VITALS — DIASTOLIC BLOOD PRESSURE: 27 MMHG | SYSTOLIC BLOOD PRESSURE: 107 MMHG

## 2019-08-29 LAB
ALBUMIN SERPL-MCNC: 2.3 G/DL (ref 3.4–5)
ALP SERPL-CCNC: 95 U/L (ref 46–116)
ALT SERPL-CCNC: 15 U/L (ref 10–68)
ANION GAP SERPL CALC-SCNC: 15.2 MMOL/L (ref 8–16)
BILIRUB SERPL-MCNC: 0.92 MG/DL (ref 0.2–1.3)
BUN SERPL-MCNC: 23 MG/DL (ref 7–18)
CALCIUM SERPL-MCNC: 7.4 MG/DL (ref 8.5–10.1)
CHLORIDE SERPL-SCNC: 103 MMOL/L (ref 98–107)
CO2 SERPL-SCNC: 20.3 MMOL/L (ref 21–32)
CREAT SERPL-MCNC: 1.7 MG/DL (ref 0.6–1.3)
ERYTHROCYTE [DISTWIDTH] IN BLOOD BY AUTOMATED COUNT: 13.5 % (ref 11.5–14.5)
GLOBULIN SER-MCNC: 3.1 G/L
GLUCOSE SERPL-MCNC: 130 MG/DL (ref 74–106)
HCT VFR BLD CALC: 20.3 % (ref 36–48)
HGB BLD-MCNC: 6.7 G/DL (ref 12–16)
MCH RBC QN AUTO: 29.4 PG (ref 26–34)
MCHC RBC AUTO-ENTMCNC: 33 G/DL (ref 31–37)
MCV RBC: 89 FL (ref 80–100)
OSMOLALITY SERPL CALC.SUM OF ELEC: 273 MOSM/KG (ref 275–300)
PLATELET # BLD: 202 10X3/UL (ref 130–400)
PMV BLD AUTO: 9.3 FL (ref 7.4–10.4)
POTASSIUM SERPL-SCNC: 4.5 MMOL/L (ref 3.5–5.1)
PROT SERPL-MCNC: 5.4 G/DL (ref 6.4–8.2)
RBC # BLD AUTO: 2.28 10X6/UL (ref 4–5.4)
SODIUM SERPL-SCNC: 134 MMOL/L (ref 136–145)
WBC # BLD AUTO: 6 10X3/UL (ref 4.8–10.8)

## 2019-08-29 NOTE — NUR
PT CO OF IV HURTING. IV INFILTATED AND SWOLLEN. SPOKE WITH SPN WHO DC IV WITH
INSTRUCTOR. 20 G RESITED TO L AC. FLUSHED WELL. NO S/S OF ACUTE DISTRESS. CL
IN PLACE.

## 2019-08-29 NOTE — OP
PATIENT NAME:  SUMEET SALAS                   MEDICAL RECORD: [id number]
:60                                             LOCATION:     D.2231
                                                         ADMISSION DATE:19
SURGEON:  BERNADETTE FREY DO         
 
 
DATE OF OPERATION:  2019
 
PROCEDURE PERFORMED:  Right hip incision and debridement with head exchange.
 
PREOPERATIVE DIAGNOSIS:  Right hip postoperative periprosthetic joint infection.
 
POSTOPERATIVE DIAGNOSIS:  Right hip postoperative periprosthetic joint
infection.
 
INDICATIONS:  Ms. Salas is a 59-year-old female who 3 weeks ago underwent a
right total hip.  Yesterday, she started to notice more pain and a little red
spot in the inferior incision and then overnight it grew and got quite large and
red.  She was very concerned, did not have any fevers but has been very
concerned about it and she called the office.  She came in today and we looked
at and she had a very large hard thigh, with likely purulence underneath it. 
Her CRP was elevated at 11.  She was direct admitted and consented for the
procedure.  She is aware of the risks including fracture, bleeding, need for
further surgery, removal of implants and 6 weeks of IV antibiotics and blood
clots and even death.  She signed the consent.
 
SURGEON:  Bernadette Frey DO
 
DESCRIPTION OF THE PROCEDURE:  The patient was taken to the operative suite,
laid in the supine position.  She was sedated and intubated and moved over to
the Langdon table and positioned.  The right hip was prepped and draped in sterile
fashion.  Timeout was performed and everyone was in agreeance as correct side,
site, and patient and procedure.
 
An incision was then began at the inferior portion of the old incision, skin
excision was made and copious amounts of purulent fluid gushed out of the wound.
 The cultures were taken at that time.  The patient was given 1 gram of
vancomycin.  We irrigated with 3 liters of normal saline down to the tensor
fascia giovanni fascia.  This was then opened.  The sutures were removed down to the
joint.  Again, 3 more liters were used for irrigation.  The hip was also
debrided.  The hip was then dislocated and the head was popped off and then
irrigated with Bactisure and 3 more liters of normal saline were used for
irrigating.  Hip was then reduced and confirmed reduction on x-ray.  The stem
was not loose at that time and was in good position.  The tensor fascia giovanni was
then closed with #2 Ethibond in a figure-of-eight fashion.  There was more
debridement then done on the superficial layer and another 3 liters normal
saline was used to irrigate.  The skin was then closed with 2-0 Monocryl
inverted interrupted fashion and 4-0 Monocryl in the skin and a Prevena VAC was
placed on the incision.
 
She was then awakened and taken to the recovery in stable condition.
 
Blood loss was approximately 200 mL.
 
COMPLICATION:  NONE.
 
TRANSINT:[id number] Voice Confirmation ID: [id number] DOCUMENT ID: [id number]
 
 
 
OPERATIVE REPORT                               [id number]    SUMEET SALAS MICHAEL D, DO         
 
 
 
Electronically Signed by BERNADETTE EDWARD on 19 at 0823
 
 
 
 
 
 
 
 
 
 
 
 
 
 
 
 
 
 
 
 
 
 
 
 
 
 
 
 
 
 
 
 
 
 
 
 
 
 
 
 
 
CC:                                                             [id number]
DICTATION DATE: 19 164     :     19 2256      ADM IN  
                                                                              
[facility]                                          
[address] [place] [address]

## 2019-08-29 NOTE — NUR
REQUESTING PAIN MEDICATION, BP /44. TORADOL ADMINISTERED PER ORDERS, PT
STATES SHE OVER DID IT IN PT TODAY, WAS IN AGREEMENT THAT GOAL FOR THIS SHIFT
WOULD BE TO GET PAIN MANAGEABLE AND ABLE TO TRANSFER WITH EASE AS PRIOR SHIFT
NOTED. NURSE ASSISTED TO CHAIR, THEN TO TOILET, GRIMACING NOTED. WILL NOTE ANY
CHANGE.

## 2019-08-29 NOTE — NUR
ASSISTED PT TO BR. CO OF NAUSEA. PHENERGAN GIVEN PER MD ORDER. NO S/S OF ACUTE
DISTRESS. CL IN PLACE.

## 2019-08-29 NOTE — NUR
PT BLOOD PRESSURE TRENDING DOWN, SEE FLOWSHEET. CALLED ANETA WILKINSN,
ORDERS FOR 500ML BOLUS AND TO CALL BACK IF NO CHANGE. GAVE BOLUS, PT BP
TRENDING UP. PT IS ASYMPTOMATIC WITH NO COMPLAINTS. WILL CTM

## 2019-08-30 VITALS — SYSTOLIC BLOOD PRESSURE: 112 MMHG | DIASTOLIC BLOOD PRESSURE: 50 MMHG

## 2019-08-30 VITALS — SYSTOLIC BLOOD PRESSURE: 127 MMHG | DIASTOLIC BLOOD PRESSURE: 46 MMHG

## 2019-08-30 VITALS — DIASTOLIC BLOOD PRESSURE: 53 MMHG | SYSTOLIC BLOOD PRESSURE: 151 MMHG

## 2019-08-30 VITALS — SYSTOLIC BLOOD PRESSURE: 133 MMHG | DIASTOLIC BLOOD PRESSURE: 57 MMHG

## 2019-08-30 VITALS — SYSTOLIC BLOOD PRESSURE: 137 MMHG | DIASTOLIC BLOOD PRESSURE: 61 MMHG

## 2019-08-30 VITALS — DIASTOLIC BLOOD PRESSURE: 46 MMHG | SYSTOLIC BLOOD PRESSURE: 128 MMHG

## 2019-08-30 VITALS — DIASTOLIC BLOOD PRESSURE: 57 MMHG | SYSTOLIC BLOOD PRESSURE: 133 MMHG

## 2019-08-30 LAB
ALBUMIN SERPL-MCNC: 2.3 G/DL (ref 3.4–5)
ALP SERPL-CCNC: 99 U/L (ref 46–116)
ALT SERPL-CCNC: 16 U/L (ref 10–68)
ANION GAP SERPL CALC-SCNC: 16.6 MMOL/L (ref 8–16)
APPEARANCE UR: CLEAR
BASOPHILS NFR BLD AUTO: 0.3 % (ref 0–2)
BILIRUB SERPL-MCNC: 1.11 MG/DL (ref 0.2–1.3)
BILIRUB SERPL-MCNC: NEGATIVE MG/DL
BUN SERPL-MCNC: 28 MG/DL (ref 7–18)
CALCIUM SERPL-MCNC: 7.7 MG/DL (ref 8.5–10.1)
CHLORIDE SERPL-SCNC: 106 MMOL/L (ref 98–107)
CO2 SERPL-SCNC: 18.5 MMOL/L (ref 21–32)
COLOR UR: YELLOW
CREAT SERPL-MCNC: 3.1 MG/DL (ref 0.6–1.3)
EOSINOPHIL NFR BLD: 8.9 % (ref 0–7)
ERYTHROCYTE [DISTWIDTH] IN BLOOD BY AUTOMATED COUNT: 14.5 % (ref 11.5–14.5)
GLOBULIN SER-MCNC: 2.9 G/L
GLUCOSE SERPL-MCNC: 91 MG/DL (ref 74–106)
GLUCOSE SERPL-MCNC: NEGATIVE MG/DL
HCT VFR BLD CALC: 25.2 % (ref 36–48)
HGB BLD-MCNC: 8.4 G/DL (ref 12–16)
IMM GRANULOCYTES NFR BLD: 0.3 % (ref 0–5)
KETONES UR STRIP-MCNC: NEGATIVE MG/DL
LYMPHOCYTES NFR BLD AUTO: 13.2 % (ref 15–50)
MCH RBC QN AUTO: 28.9 PG (ref 26–34)
MCHC RBC AUTO-ENTMCNC: 33.3 G/DL (ref 31–37)
MCV RBC: 86.6 FL (ref 80–100)
MONOCYTES NFR BLD: 8.4 % (ref 2–11)
NEUTROPHILS NFR BLD AUTO: 68.9 % (ref 40–80)
NITRITE UR-MCNC: NEGATIVE MG/ML
OSMOLALITY SERPL CALC.SUM OF ELEC: 279 MOSM/KG (ref 275–300)
PH UR STRIP: 5 [PH] (ref 5–6)
PLATELET # BLD: 182 10X3/UL (ref 130–400)
PMV BLD AUTO: 9.2 FL (ref 7.4–10.4)
POTASSIUM SERPL-SCNC: 4.1 MMOL/L (ref 3.5–5.1)
PROT SERPL-MCNC: 5.2 G/DL (ref 6.4–8.2)
PROT UR-MCNC: NEGATIVE MG/DL
RBC # BLD AUTO: 2.91 10X6/UL (ref 4–5.4)
SODIUM SERPL-SCNC: 137 MMOL/L (ref 136–145)
SP GR UR STRIP: 1.01 (ref 1–1.02)
UROBILINOGEN UR-MCNC: NORMAL MG/DL
WBC # BLD AUTO: 6.2 10X3/UL (ref 4.8–10.8)

## 2019-08-30 PROCEDURE — 05HY33Z INSERTION OF INFUSION DEVICE INTO UPPER VEIN, PERCUTANEOUS APPROACH: ICD-10-PCS | Performed by: ORTHOPAEDIC SURGERY

## 2019-08-30 NOTE — NUR
RECEIVED CARE FROM DAY NURSE.  SITTING IN BEDSIDE CHAIR.  REPORTS NO NEEDS AT
THIS TIME.  CALL LIGHT AT SIDE.  IV INFUSING AS PER ORDER TO LEFT AC.

## 2019-08-30 NOTE — NUR
PT SITTING UP IN CHAIR AT THE BEDSIDE. IV LOCATED TO LEFT AC RUNNING 1/2NS AT
@ 50ML/HR. NO S/S OF DISTRESS, WILL CONTINUE TO MONITOR.

## 2019-08-30 NOTE — NUR
BLADDER SCAN WITH RESULT OF 244ML.  ATTEMPTED CATH X6 TIMES WITH NO SUCCESS.
REFUSED FURTHER ATTEMPTS.

## 2019-08-31 VITALS — SYSTOLIC BLOOD PRESSURE: 157 MMHG | DIASTOLIC BLOOD PRESSURE: 67 MMHG

## 2019-08-31 VITALS — DIASTOLIC BLOOD PRESSURE: 49 MMHG | SYSTOLIC BLOOD PRESSURE: 154 MMHG

## 2019-08-31 VITALS — SYSTOLIC BLOOD PRESSURE: 184 MMHG | DIASTOLIC BLOOD PRESSURE: 54 MMHG

## 2019-08-31 VITALS — DIASTOLIC BLOOD PRESSURE: 57 MMHG | SYSTOLIC BLOOD PRESSURE: 146 MMHG

## 2019-08-31 VITALS — SYSTOLIC BLOOD PRESSURE: 153 MMHG | DIASTOLIC BLOOD PRESSURE: 68 MMHG

## 2019-08-31 LAB
ALBUMIN SERPL-MCNC: 2.4 G/DL (ref 3.4–5)
ALP SERPL-CCNC: 110 U/L (ref 46–116)
ALT SERPL-CCNC: 13 U/L (ref 10–68)
ANION GAP SERPL CALC-SCNC: 15.1 MMOL/L (ref 8–16)
BASOPHILS NFR BLD AUTO: 0.3 % (ref 0–2)
BILIRUB SERPL-MCNC: 0.8 MG/DL (ref 0.2–1.3)
BUN SERPL-MCNC: 27 MG/DL (ref 7–18)
CALCIUM SERPL-MCNC: 8 MG/DL (ref 8.5–10.1)
CHLORIDE SERPL-SCNC: 110 MMOL/L (ref 98–107)
CO2 SERPL-SCNC: 20.1 MMOL/L (ref 21–32)
CREAT SERPL-MCNC: 3.4 MG/DL (ref 0.6–1.3)
EOSINOPHIL NFR BLD: 8.5 % (ref 0–7)
ERYTHROCYTE [DISTWIDTH] IN BLOOD BY AUTOMATED COUNT: 14.4 % (ref 11.5–14.5)
GLOBULIN SER-MCNC: 2.6 G/L
GLUCOSE SERPL-MCNC: 99 MG/DL (ref 74–106)
HCT VFR BLD CALC: 24.8 % (ref 36–48)
HGB BLD-MCNC: 8.3 G/DL (ref 12–16)
IMM GRANULOCYTES NFR BLD: 0.5 % (ref 0–5)
LYMPHOCYTES NFR BLD AUTO: 11.1 % (ref 15–50)
MCH RBC QN AUTO: 29.3 PG (ref 26–34)
MCHC RBC AUTO-ENTMCNC: 33.5 G/DL (ref 31–37)
MCV RBC: 87.6 FL (ref 80–100)
MONOCYTES NFR BLD: 8.5 % (ref 2–11)
NEUTROPHILS NFR BLD AUTO: 71.1 % (ref 40–80)
OSMOLALITY SERPL CALC.SUM OF ELEC: 285 MOSM/KG (ref 275–300)
PLATELET # BLD: 184 10X3/UL (ref 130–400)
PMV BLD AUTO: 8.8 FL (ref 7.4–10.4)
POTASSIUM SERPL-SCNC: 4.2 MMOL/L (ref 3.5–5.1)
PROT SERPL-MCNC: 5 G/DL (ref 6.4–8.2)
RBC # BLD AUTO: 2.83 10X6/UL (ref 4–5.4)
SODIUM SERPL-SCNC: 141 MMOL/L (ref 136–145)
WBC # BLD AUTO: 7.3 10X3/UL (ref 4.8–10.8)

## 2019-08-31 PROCEDURE — 0MDL0ZZ EXTRACTION OF RIGHT HIP BURSA AND LIGAMENT, OPEN APPROACH: ICD-10-PCS | Performed by: ORTHOPAEDIC SURGERY

## 2019-08-31 NOTE — NUR
RECEIVED CARE FROM DAY NURSE.  LYING IN BED WITH EYES CLOSED.  RESP EVEN AND
UNLABORED.  CALL LIGHT AT SIDE.  IV INFUSING AS PER ORDER TO PATENT LEFT AC.
WOUND VAC TO RIGHT HIP COMPRESSED.  CONINUOUS VITALS IN PLACE POST PROCEDURE.

## 2019-08-31 NOTE — NUR
PT RESTING IN BED. NO SIGNS OF DISTRESS. IV TO LEFT AC AND RIGHT UPPER ARM
PATENT NO REDNESS OR TENDERNESS. HAS WOUND VAC TO RIGHT HIP AND DRESSING
INTACT. DENIES ANY FURTHER NEED AT THIS TIME. CALL LIGHT IN REACH. BED LOW
POSITION. NO FAMILY AT BEDSIDE AT THIS TIME.

## 2019-08-31 NOTE — NUR
POST 500ML URINATION BLADDER SCAN OF 272ML. REFUSSES PEGUERO AT THIS TIME
STATING SHE WILL ONLY DO IT IF SHE CANT FEEL IT.

## 2019-09-01 VITALS — DIASTOLIC BLOOD PRESSURE: 77 MMHG | SYSTOLIC BLOOD PRESSURE: 189 MMHG

## 2019-09-01 VITALS — DIASTOLIC BLOOD PRESSURE: 62 MMHG | SYSTOLIC BLOOD PRESSURE: 160 MMHG

## 2019-09-01 VITALS — DIASTOLIC BLOOD PRESSURE: 46 MMHG | SYSTOLIC BLOOD PRESSURE: 129 MMHG

## 2019-09-01 VITALS — SYSTOLIC BLOOD PRESSURE: 166 MMHG | DIASTOLIC BLOOD PRESSURE: 48 MMHG

## 2019-09-01 VITALS — SYSTOLIC BLOOD PRESSURE: 189 MMHG | DIASTOLIC BLOOD PRESSURE: 77 MMHG

## 2019-09-01 VITALS — DIASTOLIC BLOOD PRESSURE: 54 MMHG | SYSTOLIC BLOOD PRESSURE: 149 MMHG

## 2019-09-01 VITALS — DIASTOLIC BLOOD PRESSURE: 48 MMHG | SYSTOLIC BLOOD PRESSURE: 124 MMHG

## 2019-09-01 LAB
ALBUMIN SERPL-MCNC: 2.1 G/DL (ref 3.4–5)
ALP SERPL-CCNC: 106 U/L (ref 46–116)
ALT SERPL-CCNC: 22 U/L (ref 10–68)
ANION GAP SERPL CALC-SCNC: 17.9 MMOL/L (ref 8–16)
BASOPHILS NFR BLD AUTO: 0.1 % (ref 0–2)
BILIRUB SERPL-MCNC: 0.87 MG/DL (ref 0.2–1.3)
BUN SERPL-MCNC: 27 MG/DL (ref 7–18)
CALCIUM SERPL-MCNC: 7.9 MG/DL (ref 8.5–10.1)
CHLORIDE SERPL-SCNC: 108 MMOL/L (ref 98–107)
CO2 SERPL-SCNC: 18.6 MMOL/L (ref 21–32)
CREAT SERPL-MCNC: 3.4 MG/DL (ref 0.6–1.3)
EOSINOPHIL NFR BLD: 3.5 % (ref 0–7)
ERYTHROCYTE [DISTWIDTH] IN BLOOD BY AUTOMATED COUNT: 14.3 % (ref 11.5–14.5)
GLOBULIN SER-MCNC: 2.7 G/L
GLUCOSE SERPL-MCNC: 86 MG/DL (ref 74–106)
HCT VFR BLD CALC: 22.9 % (ref 36–48)
HGB BLD-MCNC: 7.6 G/DL (ref 12–16)
IMM GRANULOCYTES NFR BLD: 1.1 % (ref 0–5)
LYMPHOCYTES NFR BLD AUTO: 15.1 % (ref 15–50)
MCH RBC QN AUTO: 29.2 PG (ref 26–34)
MCHC RBC AUTO-ENTMCNC: 33.2 G/DL (ref 31–37)
MCV RBC: 88.1 FL (ref 80–100)
MONOCYTES NFR BLD: 10.1 % (ref 2–11)
NEUTROPHILS NFR BLD AUTO: 70.1 % (ref 40–80)
OSMOLALITY SERPL CALC.SUM OF ELEC: 282 MOSM/KG (ref 275–300)
PLATELET # BLD: 182 10X3/UL (ref 130–400)
PMV BLD AUTO: 8.8 FL (ref 7.4–10.4)
POTASSIUM SERPL-SCNC: 4.5 MMOL/L (ref 3.5–5.1)
PROT SERPL-MCNC: 4.8 G/DL (ref 6.4–8.2)
RBC # BLD AUTO: 2.6 10X6/UL (ref 4–5.4)
SODIUM SERPL-SCNC: 140 MMOL/L (ref 136–145)
WBC # BLD AUTO: 7.1 10X3/UL (ref 4.8–10.8)

## 2019-09-01 NOTE — NUR
PT LYING IN BED RESP EVEN AND UNLABORED PT C/O PAIN OF 5/10, OFFERED PAIN
 
MEDS, PT STATED SHE DOES. DEMEROL 25MG PO GIVEN AT THIS TIME. WILL CONTINUE TO
MONITOR

## 2019-09-01 NOTE — OP
PATIENT NAME:  SUMEET SALAS                   MEDICAL RECORD: [id number]
:60                                             LOCATION:     D.2231
                                                         ADMISSION DATE:19
SURGEON:  LORNE FREY DO         
 
 
DATE OF OPERATION:  2019
 
PROCEDURE PERFORMED:  Right hip irrigation and debridement.
 
PREOPERATIVE DIAGNOSIS:  Right hip infection.
 
POSTOPERATIVE DIAGNOSIS:  Right hip infection.
 
INDICATIONS:  Ms. Salas is a 59-year-old female who underwent a right total
hip approximately 3 weeks ago underwent an I&D with a head exchange on Wednesday
the , she was doing well and no fevers and then started to develop more
redness on the thigh like she did before she made the first I&D and before it
got out of control, it was delivered and taken back and washed out and made sure
there is no infection in there.  The patient had been on a few different
antibiotics and was started on Levaquin and cefepime.  The cultures just
resulted to give us the antibiotics to treat her with.  She was okay with that
and knew that if this time it did not work with the washout, we have to do an
explant of her hip and put up an antibiotic spacer.  She was okay with that too
and was aware of the risks of further infection, bleeding, damage to the nerves
and vessels, need for further surgery, blood clots, and even death and signed
the consent.
 
SURGEON:  Lorne Frey DO
 
DESCRIPTION OF THE PROCEDURE:  The patient was taken to the operative suite,
laid in the supine position, sedated, intubated and moved over to the [place] table
and once she was positioned, a timeout was performed, everyone was in agreement
with the correct side, site, patient and procedure.  She has been dosed on the
antibiotics on the floor, so no antibiotics were given.
 
The right hip was then prepped and draped in sterile fashion and a timeout had
been performed.  The incision began on the inferior portion of the incision.  An
incision was made and serosanguineous fluid gushed out of the wound.  This was
then irrigated with 3 liters of normal saline and down to the hip prosthesis. 
The tensor fascia giovanni was opened up.  There was no gross purulence in the wound
or down the prosthesis.  The wound was then irrigated with 3 liters of normal
saline.  Then, a liter of Bactisure was set up for a few minutes and then
another pulse  was used and 3 more liters of normal saline were used to
irrigate the wound.  The tensor fascia giovanni fascia was then closed with a #2
nylon in a figure-of-eight fashion and the skin was closed after antibiotic
powder was placed in deep.  This was placed prior to closing the fascia and then
a drain was placed coming out laterally in the subq space.  The skin was then
closed with 2-0 Monocryl in an inverted interrupted fashion and 4-0 Monocryl
around the skin.  Then, incisional VAC was placed.  She had a blister from the
Prevena, so an incisional VAC was carefully placed just over the wound as to not
irritate the skin, the VAC held well and the drain was then secured into place
with #2 Tegaderms.  She was awakened then and then taken to the recovery in
stable condition.
 
Blood loss approximately 100 mL.
 
COMPLICATIONS:  None.
 
 
 
OPERATIVE REPORT                               [id number]    RONALDCHELITASUMEET RAMIREZ 
 
 
 
TRANSINT:[id number] Voice Confirmation ID: [id number] DOCUMENT ID: [id number]
                                           
                                           LORNE FREY DO         
 
 
 
Electronically Signed by LORNE EDWARD on 19 at 0816
 
 
 
 
 
 
 
 
 
 
 
 
 
 
 
 
 
 
 
 
 
 
 
 
 
 
 
 
 
 
 
 
 
 
 
 
 
 
 
CC:                                                             [id number]
DICTATION DATE: 19 1543     :     19 1827      ADM IN  
                                                                              
[facility]                                          
[address] [place] [address]

## 2019-09-01 NOTE — NUR
PT RESTING IN BED. NO SIGNS OF DISTRESS. IV TO LEFT AC AND RIGHT UPPER ARM
PATENT NO REDNESS OR TENDERNESS. HAS WOUND VAC AND HEMOVAC TO RIGHT HIP
DRESSING CLEAN AND INTACT. DENIES ANY FURTHER NEED AT THIS TIME. CALL LIGHT IN
REACH. BED LOW POSITION. FAMILY AT BEDSIDE AT THIS TIME.

## 2019-09-01 NOTE — MORECARE
CASE MANAGEMENT DISCHARGE SUMMARY
 
 
PATIENT: SUMEET MCGREGOR SHARP             UNIT: [id number]
ACCOUNT#: [id number]                       ADM DATE: 19
AGE: 59     : 60  SEX: F            ROOM/BED: D.2231    
AUTHOR: MARY JORDAN                             PHYSICIAN:                               
 
REFERRING PHYSICIAN: BERNADETTE FREY DO         
DATE OF SERVICE: 19
Discharge Plan
 
 
Patient Name: SUMEET MCGREGOR
Facility: [facility]:[place]
Encounter #: [id number]
Medical Record #: [id number]
: 1960
Planned Disposition: 
Anticipated Discharge Date: 
 
Discharge Date: 
Expected LOS: 
Initial Reviewer: [id number]
Initial Review Date: 2019
Generated: 19   1:42 pm 
Comments
 
DCP- Discharge Planning
 
Updated by [id number]: Natalie Villegas on 19  11:36 am CT
Patient Name: SUMEET MCGREGOR                                     
Admission Status: Elective   
Accout number: [id number]                              
Admission Date: 2019   
: 1960                                                        
Admission Diagnosis:INFECT/INFLM REACTION DUE TO INTERNAL RIGHT HIP PROSTH,  
Attending: BERNADETTE FREY                                                
Current LOS:  4   
  
Anticipated DC Date:    
Planned Disposition:    
Primary Insurance: [place]   
  
  
Discharge Planning Comments: CM MET WITH PATIENT ABOUT DC PLANNING/NEEDS. 
STATES PLANS TO DC TO HOME AND RESUME [facility]. NOW HAS WOUND VAC, UNSURE IF 
SHE WILL HAVE IT AT TIME OF DC. CM WILL FOLLOW AND ASSIST.   
  
  
 
  
  
  
  
: Natalie Villegas
 DCPIA - Discharge Planning Initial Assessment
 
Updated by [email or web address]: Natalie Villegas on 19  12:34 pm
*  Is the patient Alert and Oriented?
Yes
*  PCP
[facility]
*  [facility]acy
[facility]
*  Preadmission Environment
Home with Family
*  ADLs
Independent
*  Equipment
Cane
Walker
*  Community resources currently utilized
Home Health
*  Please name any agencies selected above.
[facility]
*  Additional services required to return to the preadmission environment?
No
*  Can the patient safely return to the preadmission environment?
Yes
*  Has this patient been hospitalized within the prior 30 days at any 
hospital?
No
 
 
 
 
 
 
 
Last DP export: 19  11:35 am
Patient Name: SUMEET MCGREGOR
 
Encounter #: [id number]
Page [id number]
 
 
 
 
 
Electronically Signed by MARY JORDAN on 09/01/19 at 1243
 
 
 
 
 
 
**All edits/amendments must be made on the electronic document**
 
DICTATION DATE: 19     : GERARD  19 1242     
RPT#: [id number]                                DC DATE:        
                                               STATUS: ADM IN  
[facility]
 [place] [address]
***END OF REPORT***

## 2019-09-01 NOTE — MORECARE
CASE MANAGEMENT DISCHARGE SUMMARY
 
 
PATIENT: SUMEET MCGREGOR SHARP             UNIT: [id number]
ACCOUNT#: [id number]                       ADM DATE: 19
AGE: 59     : 60  SEX: F            ROOM/BED: D.2231    
AUTHOR: MARY JORDAN                             PHYSICIAN:                               
 
REFERRING PHYSICIAN: BERNADETTE FREY DO         
DATE OF SERVICE: 19
Discharge Plan
 
 
Patient Name: SUMEET MCGREGOR
Facility: [facility]:[place]
Encounter #: [id number]
Medical Record #: [id number]
: 1960
Planned Disposition: 
Anticipated Discharge Date: 
 
Discharge Date: 
Expected LOS: 
Initial Reviewer: [id number]
Initial Review Date: 2019
Generated: 19   1:34 pm 
 DCPIA - Discharge Planning Initial Assessment
 
Updated by [email or web address]: Natalie Villegas on 19  12:34 pm
*  Is the patient Alert and Oriented?
Yes
*  [facility]
*  [facility]
*  Preadmission Environment
Home with Family
*  ADLs
Independent
*  Equipment
Cane
Walker
*  Community resources currently utilized
Home Health
*  Please name any agencies selected above.
[facility]
*  Additional services required to return to the preadmission environment?
No
*  Can the patient safely return to the preadmission environment?
Yes
 
*  Has this patient been hospitalized within the prior 30 days at any 
hospital?
No
 
 
 
 
 
 
Patient Name: SUMEET MCGREGOR
Encounter #: [id number]
Page [id number]
 
 
 
 
 
Electronically Signed by MARY JORDAN on 19 at 1235
 
 
 
 
 
 
**All edits/amendments must be made on the electronic document**
 
DICTATION DATE: 19 1234     : GERARD  19 1234     
RPT#: [id number]                                DC DATE:        
                                               STATUS: ADM IN  
[facility]
[address] [place] [address]
***END OF REPORT***

## 2019-09-01 NOTE — NUR
RECEIVED CARE FROM DAY NURSE.  LYING IN BED.  NO DISTRESS NOTED.  IV INFUSING
AS PER ORDER TO PATENT LEFT AC.  RIGHT ARM PICC SL.  HEMOVAC AND WOUND VAC TO
RIGHT HIP.  HIP RED WITH SWELLING AND WEEPING.  CALL LIGHT AT SIDE.

## 2019-09-02 VITALS — SYSTOLIC BLOOD PRESSURE: 163 MMHG | DIASTOLIC BLOOD PRESSURE: 65 MMHG

## 2019-09-02 VITALS — SYSTOLIC BLOOD PRESSURE: 179 MMHG | DIASTOLIC BLOOD PRESSURE: 77 MMHG

## 2019-09-02 VITALS — DIASTOLIC BLOOD PRESSURE: 71 MMHG | SYSTOLIC BLOOD PRESSURE: 181 MMHG

## 2019-09-02 VITALS — SYSTOLIC BLOOD PRESSURE: 191 MMHG | DIASTOLIC BLOOD PRESSURE: 76 MMHG

## 2019-09-02 VITALS — SYSTOLIC BLOOD PRESSURE: 170 MMHG | DIASTOLIC BLOOD PRESSURE: 87 MMHG

## 2019-09-02 VITALS — DIASTOLIC BLOOD PRESSURE: 70 MMHG | SYSTOLIC BLOOD PRESSURE: 181 MMHG

## 2019-09-02 LAB
ALBUMIN SERPL-MCNC: 2.2 G/DL (ref 3.4–5)
ALP SERPL-CCNC: 111 U/L (ref 46–116)
ALT SERPL-CCNC: 23 U/L (ref 10–68)
ANION GAP SERPL CALC-SCNC: 15.4 MMOL/L (ref 8–16)
APPEARANCE UR: CLEAR
BASOPHILS NFR BLD AUTO: 0.3 % (ref 0–2)
BILIRUB SERPL-MCNC: 0.39 MG/DL (ref 0.2–1.3)
BILIRUB SERPL-MCNC: NEGATIVE MG/DL
BUN SERPL-MCNC: 24 MG/DL (ref 7–18)
CALCIUM SERPL-MCNC: 8.4 MG/DL (ref 8.5–10.1)
CHLORIDE SERPL-SCNC: 110 MMOL/L (ref 98–107)
CO2 SERPL-SCNC: 19.7 MMOL/L (ref 21–32)
COLOR UR: YELLOW
CREAT SERPL-MCNC: 3.3 MG/DL (ref 0.6–1.3)
EOSINOPHIL NFR BLD: 8.8 % (ref 0–7)
ERYTHROCYTE [DISTWIDTH] IN BLOOD BY AUTOMATED COUNT: 14.3 % (ref 11.5–14.5)
GLOBULIN SER-MCNC: 3.4 G/L
GLUCOSE SERPL-MCNC: 95 MG/DL (ref 74–106)
GLUCOSE SERPL-MCNC: NEGATIVE MG/DL
HCT VFR BLD CALC: 23.1 % (ref 36–48)
HGB BLD-MCNC: 7.6 G/DL (ref 12–16)
IMM GRANULOCYTES NFR BLD: 1.5 % (ref 0–5)
KETONES UR STRIP-MCNC: NEGATIVE MG/DL
LYMPHOCYTES NFR BLD AUTO: 14.8 % (ref 15–50)
MAGNESIUM SERPL-MCNC: 1.8 MG/DL (ref 1.8–2.4)
MCH RBC QN AUTO: 28.9 PG (ref 26–34)
MCHC RBC AUTO-ENTMCNC: 32.9 G/DL (ref 31–37)
MCV RBC: 87.8 FL (ref 80–100)
MONOCYTES NFR BLD: 7.9 % (ref 2–11)
NEUTROPHILS NFR BLD AUTO: 66.7 % (ref 40–80)
NITRITE UR-MCNC: NEGATIVE MG/ML
OSMOLALITY SERPL CALC.SUM OF ELEC: 284 MOSM/KG (ref 275–300)
PH UR STRIP: 5 [PH] (ref 5–6)
PLATELET # BLD: 196 10X3/UL (ref 130–400)
PMV BLD AUTO: 8.7 FL (ref 7.4–10.4)
POTASSIUM SERPL-SCNC: 4.1 MMOL/L (ref 3.5–5.1)
PROT SERPL-MCNC: 5.6 G/DL (ref 6.4–8.2)
PROT UR-MCNC: NEGATIVE MG/DL
RBC # BLD AUTO: 2.63 10X6/UL (ref 4–5.4)
SODIUM SERPL-SCNC: 141 MMOL/L (ref 136–145)
SP GR UR STRIP: 1.01 (ref 1–1.02)
UROBILINOGEN UR-MCNC: NORMAL MG/DL
WBC # BLD AUTO: 6.7 10X3/UL (ref 4.8–10.8)

## 2019-09-02 NOTE — NUR
PT RESTING IN BED WITH EYES CLOSED, EASILY AROUSED TO SPEECH. ALERT AND
ORIENTED, DENIES NEEDS AT THIS TIME. IV LOCATED TO LEFT AC RUNNING 1/2NS AT
50ML/HR AND PICC LINE LOCATED IN RIGHT UPPER ARM CURRENTLY SL. WILL CONT TO
MONITOR.

## 2019-09-02 NOTE — MORECARE
CASE MANAGEMENT DISCHARGE SUMMARY
 
 
PATIENT: SUMEET MCGREGOR SHARP             UNIT: [id number]
ACCOUNT#: [id number]                       ADM DATE: 19
AGE: 59     : 60  SEX: F            ROOM/BED: D.2231    
AUTHOR: JULIAN,DOC                             PHYSICIAN:                               
 
REFERRING PHYSICIAN: BERNADETTE FREY DO         
DATE OF SERVICE: 19
Discharge Plan
 
 
Patient Name: SUMEET MCGREGOR
Facility: [facility]:[place]
Encounter #: [id number]
Medical Record #: [id number]
: 1960
Planned Disposition: Home Hlth Svc w Plan Readm
Anticipated Discharge Date: 
 
Discharge Date: 
Expected LOS: 
Initial Reviewer: [id number]
Initial Review Date: 2019
Generated: 19  11:28 am 
Comments
 
DCP- Discharge Planning
 
Updated by [email or web address]: Aziza Crumrobert on 19   9:22 am CT
Met with patient to discuss discharge planning/needs, [facility]. She states either herself or her fianc? (works from 
midnight til 1400) will be able to deliver home antibiotics. I will need to 
know the antibiotic she is going home with along with the stop date to order 
from [employer]. CM will continue to follow and assist with 
discharge planning/needs.
DCP- Discharge Planning
 
Updated by [id number]: Natalie Villegas on 19  11:36 am CT
Patient Name: SUMEET MCGREGOR                                     
Admission Status: Elective   
Accout number: [id number]                              
Admission Date: 2019   
: 1960                                                        
Admission Diagnosis:INFECT/INFLM REACTION DUE TO INTERNAL RIGHT HIP PROSTH,  
Attending: BERNADETTE FREY                                                
Current LOS:  4   
  
Anticipated DC Date:    
 
Planned Disposition:    
Primary Insurance: [place]   
  
  
Discharge Planning Comments: CM MET WITH PATIENT ABOUT DC PLANNING/NEEDS. 
STATES PLANS TO DC TO HOME AND [facility]. NOW HAS WOUND VAC, UNSURE IF 
SHE WILL HAVE IT AT TIME OF DC. CM WILL FOLLOW AND ASSIST.   
  
  
  
  
  
  
: Natalie Villegas
 DCPIA - Discharge Planning Initial Assessment
 
Updated by [email or web address]: Natalie Villegas on 19  12:34 pm
*  Is the patient Alert and Oriented?
Yes
*  [facility]P
[facility]
*  [facility]
*  Preadmission Environment
Home with Family
*  ADLs
Independent
*  Equipment
Cane
Walker
*  Community resources currently utilized
Home Health
*  Please name any agencies selected above.
[facility]
*  Additional services required to return to the preadmission environment?
No
*  Can the patient safely return to the preadmission environment?
Yes
*  Has this patient been hospitalized within the prior 30 days at any 
hospital?
No
 
 
 
 
 
Coverage Notice
 
Reviewer: [email or web address] Dwayne Jose
 
Notice Issued Date-Time: 2019  10:18
Notice Type: Patient Choice Letter
 
 
Notice Delivered To: Patient
Relationship to Patient: Self
Representative Name: 
 
Delivery Method: HAND - Hand Delivered
Mirna Days:
Prior Verbal Notification: 
 
Recipient Understood Notice: Yes
Recipient Signature: Yes
Med Rec Note Co-signed by Attending:
 
Coverage Notice Comment:  [facility] [place]
 
Last DP export: 19   9:22 am
Patient Name: SUMEET MCGREGOR
Encounter #: [id number]
Page [id number]
 
 
 
 
 
Electronically Signed by MARY JORDAN on 19 at 1028
 
 
 
 
 
 
**All edits/amendments must be made on the electronic document**
 
DICTATION DATE: 19 1028     : GERARD  19 1028     
RPT#: [id number]                                DC DATE:        
                                               STATUS: ADM IN  
[facility]
[address] [place] [address]
***END OF REPORT***

## 2019-09-02 NOTE — MORECARE
CASE MANAGEMENT DISCHARGE SUMMARY
 
 
PATIENT: SUMEET MCGREGOR SHARP             UNIT: [id number]
ACCOUNT#: [id number]                       ADM DATE: 19
AGE: 59     : 60  SEX: F            ROOM/BED: D.2231    
AUTHOR: MARY JORDAN                             PHYSICIAN:                               
 
REFERRING PHYSICIAN: BERNADETTE FREY DO         
DATE OF SERVICE: 19
Discharge Plan
 
 
Patient Name: SUMEET MCGREGOR
Facility: [facility]:[place]
Encounter #: [id number]
Medical Record #: [id number]
: 1960
Planned Disposition: Home Hlth Svc w Plan Readm
Anticipated Discharge Date: 
 
Discharge Date: 
Expected LOS: 
Initial Reviewer: [email or web address]
Initial Review Date: 2019
Generated: 19  11:21 am 
Comments
 
DCP- Discharge Planning
 
Updated by [email or web address]: Natalie Villegas on 19  11:36 am CT
Patient Name: SUMEET MCGREGOR                                     
Admission Status: Elective   
Accout number: [id number]                              
Admission Date: 2019   
: 1960                                                        
Admission Diagnosis:INFECT/INFLM REACTION DUE TO INTERNAL RIGHT HIP PROSTH,  
Attending: BERNADETTE FREY                                                
Current LOS:  4   
  
Anticipated DC Date:    
Planned Disposition:    
Primary Insurance: [place]   
  
  
Discharge Planning Comments: CM MET WITH PATIENT ABOUT DC PLANNING/NEEDS. 
STATES PLANS TO DC TO HOME AND [facility]. NOW HAS WOUND VAC, UNSURE IF 
SHE WILL HAVE IT AT TIME OF DC. CM WILL FOLLOW AND ASSIST.   
  
  
 
  
  
  
  
: Natalie Villegas
 DCPIA - Discharge Planning Initial Assessment
 
Updated by [id number]: Natalie Villegas on 19  12:34 pm
*  Is the patient Alert and Oriented?
Yes
*  [facility]
*  [facility]
*  Preadmission Environment
Home with Family
*  ADLs
Independent
*  Equipment
Cane
Walker
*  Community resources currently utilized
Home Health
*  Please name any agencies selected above.
[facility]
*  Additional services required to return to the preadmission environment?
No
*  Can the patient safely return to the preadmission environment?
Yes
*  Has this patient been hospitalized within the prior 30 days at any 
hospital?
No
 
 
 
 
 
Coverage Notice
 
Reviewer: [id number] Dwayne Jose
 
Notice Issued Date-Time: 2019  10:18
Notice Type: Patient Choice Letter
 
Notice Delivered To: Patient
Relationship to Patient: Self
Representative Name: 
 
Delivery Method: HAND - Hand Delivered
Mirna Days:
Prior Verbal Notification: 
 
 
Recipient Understood Notice: Yes
Recipient Signature: Yes
Med Rec Note Co-signed by Attending:
 
Coverage Notice Comment:  [facility] and [place]
 
Last DP export: 19  11:43 am
Patient Name: SUMEET MCGREGOR
Encounter #: [id number]
Page [id number]
 
 
 
 
 
Electronically Signed by MARY JORDAN on 19 at 1022
 
 
 
 
 
 
**All edits/amendments must be made on the electronic document**
 
DICTATION DATE: 19 1021     : GERARD  19 1021     
RPT#: [id number]                                DC DATE:        
                                               STATUS: ADM IN  
[facility]
[address] [place] [address]
***END OF REPORT***

## 2019-09-02 NOTE — NUR
A&O X 4. PT IS COOL AND CLAMMY. VS STABLE. HEMOVAC AND PREVENA WOUND VAC
PRESENT TO RIGHT HIP. PT REPORTS MILD NAUSEA THAT IS BEING DECENTLY CONTROLLED
BY ZOFRAN DRIP. PT DENIES PAIN AT THIS TIME, WILL CONTINUE TO MONITOR.

## 2019-09-03 VITALS — DIASTOLIC BLOOD PRESSURE: 60 MMHG | SYSTOLIC BLOOD PRESSURE: 163 MMHG

## 2019-09-03 VITALS — SYSTOLIC BLOOD PRESSURE: 173 MMHG | DIASTOLIC BLOOD PRESSURE: 64 MMHG

## 2019-09-03 VITALS — DIASTOLIC BLOOD PRESSURE: 80 MMHG | SYSTOLIC BLOOD PRESSURE: 197 MMHG

## 2019-09-03 VITALS — DIASTOLIC BLOOD PRESSURE: 57 MMHG | SYSTOLIC BLOOD PRESSURE: 167 MMHG

## 2019-09-03 VITALS — DIASTOLIC BLOOD PRESSURE: 64 MMHG | SYSTOLIC BLOOD PRESSURE: 165 MMHG

## 2019-09-03 VITALS — DIASTOLIC BLOOD PRESSURE: 62 MMHG | SYSTOLIC BLOOD PRESSURE: 131 MMHG

## 2019-09-03 LAB
ALBUMIN SERPL-MCNC: 2.3 G/DL (ref 3.4–5)
ALP SERPL-CCNC: 117 U/L (ref 46–116)
ALT SERPL-CCNC: 33 U/L (ref 10–68)
ANION GAP SERPL CALC-SCNC: 16.2 MMOL/L (ref 8–16)
BASOPHILS NFR BLD AUTO: 0.3 % (ref 0–2)
BILIRUB SERPL-MCNC: 0.52 MG/DL (ref 0.2–1.3)
BUN SERPL-MCNC: 26 MG/DL (ref 7–18)
CALCIUM SERPL-MCNC: 8.4 MG/DL (ref 8.5–10.1)
CHLORIDE SERPL-SCNC: 109 MMOL/L (ref 98–107)
CK MB SERPL-MCNC: 0.9 U/L (ref 0–3.6)
CK SERPL-CCNC: 51 UL (ref 21–215)
CO2 SERPL-SCNC: 19.8 MMOL/L (ref 21–32)
CREAT SERPL-MCNC: 3.3 MG/DL (ref 0.6–1.3)
EOSINOPHIL NFR BLD: 8.2 % (ref 0–7)
ERYTHROCYTE [DISTWIDTH] IN BLOOD BY AUTOMATED COUNT: 14.6 % (ref 11.5–14.5)
ERYTHROCYTE [SEDIMENTATION RATE] IN BLOOD: 45 MM/HR (ref 0–30)
GLOBULIN SER-MCNC: 3 G/L
GLUCOSE SERPL-MCNC: 85 MG/DL (ref 74–106)
HCT VFR BLD CALC: 26.4 % (ref 36–48)
HGB BLD-MCNC: 8.8 G/DL (ref 12–16)
IMM GRANULOCYTES NFR BLD: 0.9 % (ref 0–5)
LYMPHOCYTES NFR BLD AUTO: 12.5 % (ref 15–50)
MAGNESIUM SERPL-MCNC: 1.7 MG/DL (ref 1.8–2.4)
MCH RBC QN AUTO: 28.7 PG (ref 26–34)
MCHC RBC AUTO-ENTMCNC: 33.3 G/DL (ref 31–37)
MCV RBC: 86 FL (ref 80–100)
MONOCYTES NFR BLD: 8 % (ref 2–11)
NEUTROPHILS NFR BLD AUTO: 70.1 % (ref 40–80)
OSMOLALITY SERPL CALC.SUM OF ELEC: 284 MOSM/KG (ref 275–300)
PLATELET # BLD: 186 10X3/UL (ref 130–400)
PMV BLD AUTO: 8.9 FL (ref 7.4–10.4)
POTASSIUM SERPL-SCNC: 4 MMOL/L (ref 3.5–5.1)
PROT SERPL-MCNC: 5.3 G/DL (ref 6.4–8.2)
RBC # BLD AUTO: 3.07 10X6/UL (ref 4–5.4)
SODIUM SERPL-SCNC: 141 MMOL/L (ref 136–145)
TROPONIN I SERPL-MCNC: < 0.017 NG/ML (ref 0–0.06)
WBC # BLD AUTO: 7.7 10X3/UL (ref 4.8–10.8)

## 2019-09-03 NOTE — MORECARE
CASE MANAGEMENT DISCHARGE SUMMARY
 
 
PATIENT: SUMEET MCGREGOR SHARP             UNIT: [id number]
ACCOUNT#: [id number]                       ADM DATE: 19
AGE: 59     : 60  SEX: F            ROOM/BED: D.2231    
AUTHOR: JULIAN,DOC                             PHYSICIAN:                               
 
REFERRING PHYSICIAN: BERNADETTE FREY DO         
DATE OF SERVICE: 19
Discharge Plan
 
 
Patient Name: SUMEET MCGREGOR
Facility: [facility]:[place]
Encounter #: [id number]
Medical Record #: [id number]
: 1960
Planned Disposition: Home Hlth Svc w Plan Readm
Anticipated Discharge Date: 
 
Discharge Date: 
Expected LOS: 
Initial Reviewer: [email or web address]
Initial Review Date: 2019
Generated: 9/3/19   4:33 pm 
Comments
 
DCP- Discharge Planning
 
Updated by [id number]: Aziza Jose on 9/3/19   2:26 pm CT
Received a call from Mari with [facility] pharmacy that Medicaid will cover 
all of the infusion costs. CM to call when patient is discharged for them to 
mix the medication and do first infusion teaching. CM will continue to follow 
and assist with discharge planning/needs.
DCP- Discharge Planning
 
Updated by [email or web address]: Aziza Jose on 9/3/19  10:17 am CT
I have faxed Dr. Frey's antibiotic orders and clinical to [facility] and 
[facility]. I spoke with Ralph with [facility] and Jolie with [facility] and 
[facility] is accepting. CM will continue to follow and assist with discharge 
planning/needs.
DCP- Discharge Planning
 
Updated by [email or web address]: Aziza Jose on 19   9:22 am CT
Met with patient to discuss discharge planning/needs, She will [facility] on discharge. She states either herself or her fianc? (works from 
midnight til 1400) will be able to deliver home antibiotics. I will need to 
know the antibiotic she is going home with along with the stop date to order 
from [facility]. CM will continue to follow and assist with 
 
discharge planning/needs.
DCP- Discharge Planning
 
Updated by [id number]: Natalie Villegas on 19  11:36 am CT
Patient Name: SUMEET MCGREGOR                                     
Admission Status: Elective   
Accout number: [id number]                              
Admission Date: 2019   
: 1960                                                        
Admission Diagnosis:INFECT/INFLM REACTION DUE TO INTERNAL RIGHT HIP PROSTH,  
Attending: BERNADETTE FREY                                                
Current LOS:  4   
  
Anticipated DC Date:    
Planned Disposition:    
Primary Insurance: [place]   
  
  
Discharge Planning Comments: CM MET WITH PATIENT ABOUT DC PLANNING/NEEDS. 
STATES PLANS TO DC TO HOME AND [facility]. NOW HAS WOUND VAC, UNSURE IF 
SHE WILL HAVE IT AT TIME OF DC. CM WILL FOLLOW AND ASSIST.   
  
  
  
  
  
  
: Natalie Villegas
 DCPIA - Discharge Planning Initial Assessment
 
Updated by [email or web address]: Natalie Villegas on 19  12:34 pm
*  Is the patient Alert and Oriented?
Yes
*  [facility]
*  [facility]
*  Preadmission Environment
Home with Family
*  ADLs
Independent
*  Equipment
Cane
[facility]
*  Community resources currently utilized
Home Health
*  Please name any agencies selected above.
[facility]
*  Additional services required to return to the preadmission environment?
No
*  Can the patient safely return to the preadmission environment?
Yes
 
*  Has this patient been hospitalized within the prior 30 days at any 
hospital?
No
 
 
 
 
 
Coverage Notice
 
Reviewer: [id number] Dwayne Jose
 
Notice Issued Date-Time: 2019  10:18
Notice Type: Patient Choice Letter
 
Notice Delivered To: Patient
Relationship to Patient: Self
Representative Name: 
 
Delivery Method: HAND - Hand Delivered
Mirna Days:
Prior Verbal Notification: 
 
Recipient Understood Notice: Yes
Recipient Signature: Yes
Med Rec Note Co-signed by Attending:
 
Coverage Notice Comment:  [facility] [place]
 
Last DP export: 9/3/19  10:21 am
Patient Name: SUMEET MCGREGOR
Encounter #: [id number]
Page [id number]
 
 
 
 
 
Electronically Signed by MARY JORDAN on 19 at 1533
 
 
 
 
 
 
**All edits/amendments must be made on the electronic document**
 
DICTATION DATE: 19 1533     : GERARD  19 1533     
RPT#: [id number]                                DC DATE:        
                                               STATUS: ADM IN  
[facility]
 [place] [address]
***END OF REPORT***

## 2019-09-03 NOTE — MORECARE
CASE MANAGEMENT DISCHARGE SUMMARY
 
 
PATIENT: SUMEET MCGREGOR SHARP             UNIT: [id number]
ACCOUNT#: [id number]                       ADM DATE: 19
AGE: 59     : 60  SEX: F            ROOM/BED: D.2231    
AUTHOR: JULIAN,DOC                             PHYSICIAN:                               
 
REFERRING PHYSICIAN: BERNADETTE FREY DO         
DATE OF SERVICE: 19
Discharge Plan
 
 
Patient Name: SUMEET MCGREGOR
Facility: [facility]:[place]
Encounter #: [id number]
Medical Record #: [id number]
: 1960
Planned Disposition: Home Hlth Svc w Plan Readm
Anticipated Discharge Date: 
 
Discharge Date: 
Expected LOS: 
Initial Reviewer: [email or web address]
Initial Review Date: 2019
Generated: 9/3/19  12:13 pm 
DCP- Discharge Planning
 
Updated by [id number]: Aziza Jose on 19   9:22 am CT
Met with patient to discuss discharge planning/needs, She will [facility] on discharge. She states either herself or her fianc? (works from 
midnight til 1400) will be able to deliver home antibiotics. I will need to 
know the antibiotic she is going home with along with the stop date to order 
from [facility]. CM will continue to follow and assist with 
discharge planning/needs.
DCP- Discharge Planning
 
Updated by [id number]: Natalie Villegas on 19  11:36 am CT
Patient Name: SUMEET MCGREGOR                                     
Admission Status: Elective   
Accout number: [id number]                              
Admission Date: 2019   
: 1960                                                        
Admission Diagnosis:INFECT/INFLM REACTION DUE TO INTERNAL RIGHT HIP PROSTH,  
Attending: BERNADETTE FREY                                                
Current LOS:  4   
  
Anticipated DC Date:    
Planned Disposition:    
Primary Insurance: [place]ID [place]   
 
  
  
Discharge Planning Comments: CM MET WITH PATIENT ABOUT DC PLANNING/NEEDS. 
STATES PLANS TO DC TO HOME AND [facility]. NOW HAS WOUND VAC, UNSURE IF 
SHE WILL HAVE IT AT TIME OF DC. CM WILL FOLLOW AND ASSIST.   
  
  
  
  
  
  
: Natalie Villegas
 DCPIA - Discharge Planning Initial Assessment
 
Updated by [id number]: Natalie Villegas on 19  12:34 pm
*  Is the patient Alert and Oriented?
Yes
*  [facility]
*  [facility]
*  Preadmission Environment
Home with Family
*  ADLs
Independent
*  Equipment
Cane
[facility]
*  Community resources currently utilized
Home Health
*  Please name any agencies selected above.
[facility]
*  Additional services required to return to the preadmission environment?
No
*  Can the patient safely return to the preadmission environment?
Yes
*  Has this patient been hospitalized within the prior 30 days at any 
hospital?
No
 
External Providers
External Provider: [facility]
 
Next Contact Date: 
Service Request Date: 
Service Type: 
Resolution: 
 
Reviewer: 
Comments: 
 
 
 
 
 
Coverage Notice
 
Reviewer: [id number] Dwayne Jose
 
Notice Issued Date-Time: 2019  10:18
Notice Type: Patient Choice Letter
 
Notice Delivered To: Patient
Relationship to Patient: Self
Representative Name: 
 
Delivery Method: HAND - Hand Delivered
Mirna Days:
Prior Verbal Notification: 
 
Recipient Understood Notice: Yes
Recipient Signature: Yes
Med Rec Note Co-signed by Attending:
 
Coverage Notice Comment:  [facility] for [facility] and [place]
 
Last DP export: 9/3/19   9:18 am
Patient Name: SUMEET MCGREGOR
Encounter #: [id number]
Page [id number]
 
 
 
 
 
Electronically Signed by MARY JORDAN on 19 at 1113
 
 
 
 
 
 
**All edits/amendments must be made on the electronic document**
 
DICTATION DATE: 19 1113     : GERARD  19 1113     
RPT#: [id number]                                DC DATE:        
                                               STATUS: ADM IN  
[facility]
[address] [place] [address]
***END OF REPORT***

## 2019-09-03 NOTE — MORECARE
CASE MANAGEMENT DISCHARGE SUMMARY
 
 
PATIENT: SUMEET MCGREGOR SHARP             UNIT: [id number]
ACCOUNT#: [id number]                       ADM DATE: 19
AGE: 59     : 60  SEX: F            ROOM/BED: D.2231    
AUTHOR: JULIAN,DOC                             PHYSICIAN:                               
 
REFERRING PHYSICIAN: BERNADETTE FREY DO         
DATE OF SERVICE: 19
Discharge Plan
 
 
Patient Name: SUMEET MCGREGOR
Facility: [facility]:[place]
Encounter #: [id number]
Medical Record #: [id number]
: 1960
Planned Disposition: Home Hlth Svc w Plan Readm
Anticipated Discharge Date: 
 
Discharge Date: 
Expected LOS: 
Initial Reviewer: [email or web address]
Initial Review Date: 2019
Generated: 9/3/19  11:18 am 
DCP- Discharge Planning
 
Updated by [id number]: Aziza Jose on 19   9:22 am CT
Met with patient to discuss discharge planning/needs, She will [facility] on discharge. She states either herself or her fianc? (works from 
midnight til 1400) will be able to deliver home antibiotics. I will need to 
know the antibiotic she is going home with along with the stop date to order 
from [facility]. CM will continue to follow and assist with 
discharge planning/needs.
DCP- Discharge Planning
 
Updated by [id number]: Natalie Villegas on 19  11:36 am CT
Patient Name: SUMEET MCGREGOR                                     
Admission Status: Elective   
Accout number: [id number]                              
Admission Date: 2019   
: 1960                                                        
Admission Diagnosis:INFECT/INFLM REACTION DUE TO INTERNAL RIGHT HIP PROSTH,  
Attending: BERNADETTE FREY                                                
Current LOS:  4   
  
Anticipated DC Date:    
Planned Disposition:    
Primary Insurance: [place]   
 
  
  
Discharge Planning Comments: CM MET WITH PATIENT ABOUT DC PLANNING/NEEDS. 
STATES PLANS TO DC TO HOME AND [facility]. NOW HAS WOUND VAC, UNSURE IF 
SHE WILL HAVE IT AT TIME OF DC. CM WILL FOLLOW AND ASSIST.   
  
  
  
  
  
  
: Natalie Villegas
 DCPIA - Discharge Planning Initial Assessment
 
Updated by [id number]: Natalie Villegas on 19  12:34 pm
*  Is the patient Alert and Oriented?
Yes
*  [facility]
*  [facility]
*  Preadmission Environment
Home with Family
*  ADLs
Independent
*  Equipment
Cane
[facility]
*  Community resources currently utilized
Home Health
*  Please name any agencies selected above.
[facility]
*  Additional services required to return to the preadmission environment?
No
*  Can the patient safely return to the preadmission environment?
Yes
*  Has this patient been hospitalized within the prior 30 days at any 
hospital?
No
 
External Providers
External Provider: [facility]
 
Next Contact Date: 
Service Request Date: 
Service Type: 
Resolution: 
 
Reviewer: 
Comments: 
 
 
 
 
 
Coverage Notice
 
Reviewer: [email or web address] Dwayne Worthyy Damon
 
Notice Issued Date-Time: 2019  10:18
Notice Type: Patient Choice Letter
 
Notice Delivered To: Patient
Relationship to Patient: Self
Representative Name: 
 
Delivery Method: HAND - Hand Delivered
Mirna Days:
Prior Verbal Notification: 
 
Recipient Understood Notice: Yes
Recipient Signature: Yes
Med Rec Note Co-signed by Attending:
 
Coverage Notice Comment:  [facility] for [facility] and [place]
 
Last DP export: 19   9:28 am
Patient Name: SUMEET MCGREGOR
Encounter #: [id number]
Page [id number]
 
 
 
 
 
Electronically Signed by MARY JORDAN on 19 at 1018
 
 
 
 
 
 
**All edits/amendments must be made on the electronic document**
 
DICTATION DATE: 19 1018     : GERARD  19 1018     
RPT#: [id number]                                DC DATE:        
                                               STATUS: ADM IN  
[facility]
[address] [place] [address]
***END OF REPORT***

## 2019-09-03 NOTE — NUR
C/O PRESSER TO CHEST APN ON FLOOR NEW ORDERS FOR LABS EKG AND NITROSTAT GIVE
X1 PT STATED SHE WAS NOT HAVING ANY MORE PAIN. RESTING WITH WATER AND CALL
LIGHT IN REACH NO S/S OF DISTRESS NOTED OR STATED. SHE IS ALERT AND ORENTED
ABLE TO VOICE NEEDS AND WANTS TO STAFF. BED LOW CHECKED OFTEN FOR NEEDS AND
SAFETY.

## 2019-09-03 NOTE — NUR
PT ON CALL LIGHT, REPORTS CHEST FEELING HEAVY AND PAIN WHEN BREATHING HEAVY.
HINA CORNELL IS ON FLOOR AND NOTIFIED IMMEDIATELY, HE ORDERED EKG AND CARDIAC
LABS. EKG NORMAL, TOLD ME TO MAKE SURE VITALS WERE GOOD AND GIVE NITRO. PT
STATES THIS HAS HAPPENED AT HOME MULTIPLE TIMES AND SHE ALWAYS TAKES A NITRO
AND IT GOES AWAY. REPORT PASSED ON TO NIGHT SHIFT NURSE FIDE.

## 2019-09-03 NOTE — MORECARE
CASE MANAGEMENT DISCHARGE SUMMARY
 
 
PATIENT: SUMEET MCGREGOR SHARP             UNIT: [id number]
ACCOUNT#: [id number]                       ADM DATE: 19
AGE: 59     : 60  SEX: F            ROOM/BED: D.2231    
AUTHOR: JULIAN,MARY                             PHYSICIAN:                               
 
REFERRING PHYSICIAN: BERNADETTE FREY DO         
DATE OF SERVICE: 19
Discharge Plan
 
 
Patient Name: SUMEET MCGREGOR
Facility: [facility]:[place]
Encounter #: [id number]
Medical Record #: [id number]
: 1960
Planned Disposition: Home Hlth Svc w Plan Readm
Anticipated Discharge Date: 
 
Discharge Date: 
Expected LOS: 
Initial Reviewer: [id number]
Initial Review Date: 2019
Generated: 9/3/19  12:21 pm 
Comments
 
DCP- Discharge Planning
 
Updated by [id number]: Aziza Jose on 9/3/19  10:17 am CT
I have faxed Dr. Frey's antibiotic orders and clinical to [facility] and 
[facility]. I spoke with Ralph with [facility] and Jolie with [facility] and 
[facility] is accepting. CM will continue to follow and assist with discharge 
planning/needs.
DCP- Discharge Planning
 
Updated by [id number]: Aziza Jose on 19   9:22 am CT
Met with patient to discuss discharge planning/needs, She will resume [facility] on discharge. She states either herself or her fianc? (works from 
midnight til 1400) will be able to deliver home antibiotics. I will need to 
know the antibiotic she is going home with along with the stop date to order 
from [facility]. CM will continue to follow and assist with 
discharge planning/needs.
DCP- Discharge Planning
 
Updated by [id number]: Natalie Villegas on 19  11:36 am CT
Patient Name: SUMEET MCGREGOR                                     
Admission Status: Elective   
Accout number: [id number]                              
 
Admission Date: 2019   
: 1960                                                        
Admission Diagnosis:INFECT/INFLM REACTION DUE TO INTERNAL RIGHT HIP PROSTH,  
Attending: BERNADETTE FREY                                                
Current LOS:  4   
  
Anticipated DC Date:    
Planned Disposition:    
Primary Insurance: MEDICAID [place]   
  
  
Discharge Planning Comments: CM MET WITH PATIENT ABOUT DC PLANNING/NEEDS. 
STATES PLANS TO DC TO HOME AND [facility]UME [facility]. NOW HAS WOUND VAC, UNSURE IF 
SHE WILL HAVE IT AT TIME OF DC. CM WILL FOLLOW AND ASSIST.   
  
  
  
  
  
  
: Natalie Nelly
 DCPIA - Discharge Planning Initial Assessment
 
Updated by [email or web address]: Natalie Villegas on 19  12:34 pm
*  Is the patient Alert and Oriented?
Yes
*  [facility]P
[facility]
*  [facility]acy
[facility]
*  Preadmission Environment
Home with Family
*  ADLs
Independent
*  Equipment
Cane
Walker
*  Community resources currently utilized
Home Health
*  Please name any agencies selected above.
[facility]
*  Additional services required to return to the preadmission environment?
No
*  Can the patient safely return to the preadmission environment?
Yes
*  Has this patient been hospitalized within the prior 30 days at any 
hospital?
No
 
 
 
 
 
 
Coverage Notice
 
Reviewer: [email or web address] Dwayne Jose
 
Notice Issued Date-Time: 2019  10:18
Notice Type: Patient Choice Letter
 
Notice Delivered To: Patient
Relationship to Patient: Self
Representative Name: 
 
Delivery Method: HAND - Hand Delivered
Mirna Days:
Prior Verbal Notification: 
 
Recipient Understood Notice: Yes
Recipient Signature: Yes
Med Rec Note Co-signed by Attending:
 
Coverage Notice Comment:  EVER[facility] for [facility] and [facility]
 
Last DP export: 9/3/19  10:13 am
Patient Name: SUMEET MCGREGOR
Encounter #: [id number]
Page [id number]
 
 
 
 
 
Electronically Signed by MARY JORDAN on 19 at 1121
 
 
 
 
 
 
**All edits/amendments must be made on the electronic document**
 
DICTATION DATE: 19 112     : GERARD  19 1121     
RPT#: [id number]                                DC DATE:        
                                               STATUS: ADM IN  
[facility]
[address] [place] [address]
***END OF REPORT***

## 2019-09-03 NOTE — NUR
PT RESTING IN BED WITH EYES CLOSED, EASILY AROUSED TO SPEECH. ALERT AND
ORIENTED. BREATHING EVEN AND NONLABORED, NO S/S OF DISTRESS. IV LOCATED TO
LEFT AC RUNNING 1/2NS @ 50ML/HR, ZOPHRAN @ 4.7, PICC LOCATED TO RIGHT UPPER
ARM CURRENTLY SL. WOUND VAC AND HEMOVAC BOTH LOCATED TO RIGHT HIP. DENIES
NEEDS AT THIS TIME, WILL CONT TO MONITOR.

## 2019-09-04 VITALS — SYSTOLIC BLOOD PRESSURE: 167 MMHG | DIASTOLIC BLOOD PRESSURE: 59 MMHG

## 2019-09-04 VITALS — SYSTOLIC BLOOD PRESSURE: 179 MMHG | DIASTOLIC BLOOD PRESSURE: 66 MMHG

## 2019-09-04 VITALS — DIASTOLIC BLOOD PRESSURE: 61 MMHG | SYSTOLIC BLOOD PRESSURE: 136 MMHG

## 2019-09-04 VITALS — DIASTOLIC BLOOD PRESSURE: 67 MMHG | SYSTOLIC BLOOD PRESSURE: 175 MMHG

## 2019-09-04 VITALS — DIASTOLIC BLOOD PRESSURE: 64 MMHG | SYSTOLIC BLOOD PRESSURE: 175 MMHG

## 2019-09-04 LAB
ALBUMIN SERPL-MCNC: 2.5 G/DL (ref 3.4–5)
ALP SERPL-CCNC: 124 U/L (ref 46–116)
ALT SERPL-CCNC: 37 U/L (ref 10–68)
ANION GAP SERPL CALC-SCNC: 17.4 MMOL/L (ref 8–16)
BASOPHILS NFR BLD AUTO: 0.1 % (ref 0–2)
BILIRUB SERPL-MCNC: 0.38 MG/DL (ref 0.2–1.3)
BUN SERPL-MCNC: 28 MG/DL (ref 7–18)
CALCIUM SERPL-MCNC: 8.5 MG/DL (ref 8.5–10.1)
CHLORIDE SERPL-SCNC: 107 MMOL/L (ref 98–107)
CK MB SERPL-MCNC: 0.5 U/L (ref 0–3.6)
CK MB SERPL-MCNC: 0.7 U/L (ref 0–3.6)
CK SERPL-CCNC: 41 UL (ref 21–215)
CK SERPL-CCNC: 45 UL (ref 21–215)
CO2 SERPL-SCNC: 19.2 MMOL/L (ref 21–32)
CREAT SERPL-MCNC: 3.2 MG/DL (ref 0.6–1.3)
EOSINOPHIL NFR BLD: 6.1 % (ref 0–7)
ERYTHROCYTE [DISTWIDTH] IN BLOOD BY AUTOMATED COUNT: 13.9 % (ref 11.5–14.5)
GLOBULIN SER-MCNC: 3.6 G/L
GLUCOSE SERPL-MCNC: 94 MG/DL (ref 74–106)
HCT VFR BLD CALC: 27.6 % (ref 36–48)
HGB BLD-MCNC: 9.4 G/DL (ref 12–16)
IMM GRANULOCYTES NFR BLD: 0.7 % (ref 0–5)
LYMPHOCYTES NFR BLD AUTO: 11.6 % (ref 15–50)
MAGNESIUM SERPL-MCNC: 1.8 MG/DL (ref 1.8–2.4)
MCH RBC QN AUTO: 28.7 PG (ref 26–34)
MCHC RBC AUTO-ENTMCNC: 34.1 G/DL (ref 31–37)
MCV RBC: 84.4 FL (ref 80–100)
MONOCYTES NFR BLD: 5.8 % (ref 2–11)
NEUTROPHILS NFR BLD AUTO: 75.7 % (ref 40–80)
OSMOLALITY SERPL CALC.SUM OF ELEC: 284 MOSM/KG (ref 275–300)
PHOSPHATE SERPL-MCNC: 3.7 MG/DL (ref 2.5–4.9)
PLATELET # BLD: 203 10X3/UL (ref 130–400)
PMV BLD AUTO: 8.7 FL (ref 7.4–10.4)
POTASSIUM SERPL-SCNC: 3.6 MMOL/L (ref 3.5–5.1)
PROT SERPL-MCNC: 6.1 G/DL (ref 6.4–8.2)
RBC # BLD AUTO: 3.27 10X6/UL (ref 4–5.4)
SODIUM SERPL-SCNC: 140 MMOL/L (ref 136–145)
TROPONIN I SERPL-MCNC: < 0.017 NG/ML (ref 0–0.06)
TROPONIN I SERPL-MCNC: < 0.017 NG/ML (ref 0–0.06)
VANCOMYCIN SERPL-MCNC: 16.1 UG/ML (ref 10–20)
WBC # BLD AUTO: 8.5 10X3/UL (ref 4.8–10.8)

## 2019-09-04 NOTE — NUR
EVENING ROUNDS MADE, WILL CONTINUE POC. PATIENT IS A/OX4, UP AD KARTHIKEYAN. WOUND VAC
TO RT HIP, INTACT. PATIENT HAS RT PICC THAT IS SL AND AN IV TO LT AC WITH
FLUIDS INFUSING @20ML/HR AND ZOFRAN DRIP AT 4.7ML/HR. PATIENT DENIES NEEDS AT
THIS TIME. NO S/SX OF DISTRESS. RR EVEN AND UNLABORED. CL IN REACH, BED LOCKED
AND LOWERED. WILL CTM.

## 2019-09-04 NOTE — NUR
PT IS RESTING IN BED WITH EYES CLOSED. RESPIRATIONS ARE EVEN AND UNLABORED. PT
IS EASILY AROUSED WITH VERBAL STIMULATION. PT REPORTS PRESENCE OF NAUSEA AT
THIS TIME. ZOFRAN IS INFUSING WITHOUT DIFFICULTY. PT REQUESTS FOR BREAKFAST
TRAY TO NOT ENTER ROOM BECAUSE "THE SMELL IS GOING TO MAKE ME THROW UP". WOUND
VAC NOTED TO RIGHT HIP. DRESSING IN PLACE. DRESSING TO RIGHT HIP NOTED AND IS
C/D/I. PT WITH LEFT AC INFUSING WITHOUT DIFFICULTY. RIGHT UPPER ARM PICC LINE
IS SALINE LOCK AND FLUSHES WITHOUT DIFFICULTY. PT DENIES PRESENCE OF PAIN AT
THIS TIME. BED IS IN THE LOWEST POSITION. CALL LIGHT AND BEDSIDE TABLE ARE
WITHIN REACH. SIDE RAILS X 2. PT DENIES FURTHER NEEDS. WILL CONT TO MONITOR.

## 2019-09-05 VITALS — DIASTOLIC BLOOD PRESSURE: 65 MMHG | SYSTOLIC BLOOD PRESSURE: 189 MMHG

## 2019-09-05 VITALS — SYSTOLIC BLOOD PRESSURE: 192 MMHG | DIASTOLIC BLOOD PRESSURE: 67 MMHG

## 2019-09-05 VITALS — DIASTOLIC BLOOD PRESSURE: 71 MMHG | SYSTOLIC BLOOD PRESSURE: 166 MMHG

## 2019-09-05 VITALS — DIASTOLIC BLOOD PRESSURE: 79 MMHG | SYSTOLIC BLOOD PRESSURE: 180 MMHG

## 2019-09-05 LAB
ALBUMIN SERPL-MCNC: 2.3 G/DL (ref 3.4–5)
ALP SERPL-CCNC: 114 U/L (ref 46–116)
ALT SERPL-CCNC: 28 U/L (ref 10–68)
AMYLASE SERPL-CCNC: 10 U/L (ref 25–115)
ANION GAP SERPL CALC-SCNC: 17.2 MMOL/L (ref 8–16)
BASOPHILS NFR BLD AUTO: 0.1 % (ref 0–2)
BILIRUB SERPL-MCNC: 0.37 MG/DL (ref 0.2–1.3)
BUN SERPL-MCNC: 33 MG/DL (ref 7–18)
CALCIUM SERPL-MCNC: 8.1 MG/DL (ref 8.5–10.1)
CHLORIDE SERPL-SCNC: 107 MMOL/L (ref 98–107)
CO2 SERPL-SCNC: 19 MMOL/L (ref 21–32)
CREAT SERPL-MCNC: 3.1 MG/DL (ref 0.6–1.3)
EOSINOPHIL NFR BLD: 6.2 % (ref 0–7)
ERYTHROCYTE [DISTWIDTH] IN BLOOD BY AUTOMATED COUNT: 13.7 % (ref 11.5–14.5)
GLOBULIN SER-MCNC: 3.5 G/L
GLUCOSE SERPL-MCNC: 111 MG/DL (ref 74–106)
HCT VFR BLD CALC: 25.7 % (ref 36–48)
HGB BLD-MCNC: 8.7 G/DL (ref 12–16)
IMM GRANULOCYTES NFR BLD: 1 % (ref 0–5)
LIPASE SERPL-CCNC: 160 U/L (ref 73–393)
LYMPHOCYTES NFR BLD AUTO: 11 % (ref 15–50)
MAGNESIUM SERPL-MCNC: 1.5 MG/DL (ref 1.8–2.4)
MCH RBC QN AUTO: 28.4 PG (ref 26–34)
MCHC RBC AUTO-ENTMCNC: 33.9 G/DL (ref 31–37)
MCV RBC: 84 FL (ref 80–100)
MONOCYTES NFR BLD: 7.2 % (ref 2–11)
NEUTROPHILS NFR BLD AUTO: 74.5 % (ref 40–80)
OSMOLALITY SERPL CALC.SUM OF ELEC: 286 MOSM/KG (ref 275–300)
PLATELET # BLD: 194 10X3/UL (ref 130–400)
PMV BLD AUTO: 9.1 FL (ref 7.4–10.4)
POTASSIUM SERPL-SCNC: 3.2 MMOL/L (ref 3.5–5.1)
PROT SERPL-MCNC: 5.8 G/DL (ref 6.4–8.2)
RBC # BLD AUTO: 3.06 10X6/UL (ref 4–5.4)
SODIUM SERPL-SCNC: 140 MMOL/L (ref 136–145)
WBC # BLD AUTO: 7.6 10X3/UL (ref 4.8–10.8)

## 2019-09-05 NOTE — NUR
As per Dr. Westbrook's order, removed vac dressing and applied Prevena over
incision on right hip.  Little to no draining noted in canister. Incision site
has no redness, edema or ordor.
Pt tolerated well.

## 2019-09-05 NOTE — MORECARE
CASE MANAGEMENT DISCHARGE SUMMARY
 
 
PATIENT: SUMEET MCGREGOR SHARP             UNIT: [id number]
ACCOUNT#: [id number]                       ADM DATE: 19
AGE: 59     : 60  SEX: F            ROOM/BED: D.2231    
AUTHOR: JULIAN,DOC                             PHYSICIAN:                               
 
REFERRING PHYSICIAN: BERNADETTE FREY DO         
DATE OF SERVICE: 19
Discharge Plan
 
 
Patient Name: SUMEET MCGREGOR
Facility: [facility]:[place]
Encounter #: [id number]
Medical Record #: [id number]
: 1960
Planned Disposition: Home Hlth Svc w Plan Readm
Anticipated Discharge Date: 
 
Discharge Date: 2019
Expected LOS: 
Initial Reviewer: [id number]
Initial Review Date: 2019
Generated: 19   5:41 pm 
Comments
 
DCP- Discharge Planning
 
Updated by [id number]: Charlene Shea on 19   3:30 pm CT
Darya with [facility] called and stated that she has taught the patient and 
meds have been delivered she stated that she did that around 1400
DCP- Discharge Planning
 
Updated by [id number]: Aziza Damon on 19   2:12 pm CT
Received primary care's order and sent to [facility] for BUN/CR to be drawn 
every couple of days. I also informed the patient. [facility] has already come 
and gone and taught patient on IV infusion. Patient states she feels 
comfortable with the IV. 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 
Home today with home health and IV infusions.
DCP- Discharge Planning
 
Updated by [id number]: Aziza Jose on 19   8:17 am CT
Dr. Frey has discharged today. He is aware of lab results this am. States 
her nausea is gone. I have called Josesitomichelle at [facility] and they will 
see her for her evening dose of antibiotic. I informed her that the levaquin 
is due tomorrow. I spoke with Ralph at [facility] and he will give me a time 
that they will be here today for the first instruction. I spoke with patient 
and she is agreeable to discharge plan. She is ambulating in the phan at this 
 
time. CM will continue to follow and assist with discharge planning/needs. 
Home today with HH and infusion services.
DCP- Discharge Planning
 
Updated by [id number]: Aziza Jose on 9/3/19   2:26 pm CT
Received a call from Mari with [facility] pharmacy that Medicaid will cover 
all of the infusion costs. CM to call when patient is discharged for them to 
mix the medication and do first infusion teaching. CM will continue to follow 
and assist with discharge planning/needs.
DCP- Discharge Planning
 
Updated by [id number]: Aziza Damon on 9/3/19  10:17 am CT
I have faxed Dr. Frey's antibiotic orders and clinical to [facility] and 
[facility]. I spoke with Ralph with [facility] and Jolie with [facility] and 
[facility]. CM will continue to follow and assist with discharge 
planning/needs.
DCP- Discharge Planning
 
Updated by [id number]: Aziza Jose on 19   9:22 am CT
Met with patient to discuss discharge planning/needs, She will [facility] on discharge. She states either herself or her fianc? (works from 
midnight til 1400) will be able to deliver home antibiotics. I will need to 
know the antibiotic she is going home with along with the stop date to order 
from [facility]. CM will continue to follow and assist with 
discharge planning/needs.
DCP- Discharge Planning
 
Updated by [email or web address]: Natalie Villegas on 19  11:36 am CT
Patient Name: SUMEET MCGREGOR                                     
Admission Status: Elective   
Accout number: [id number]                              
Admission Date: 2019   
: 1960                                                        
Admission Diagnosis:INFECT/INFLM REACTION DUE TO INTERNAL RIGHT HIP PROSTH,  
Attending: BERNADETTE FREY                                                
Current LOS:  4   
  
Anticipated DC Date:    
Planned Disposition:    
Primary Insurance: MEDICAID [place]   
  
  
Discharge Planning Comments: CM MET WITH PATIENT ABOUT DC PLANNING/NEEDS. 
STATES PLANS TO DC TO HOME AND [facility]. NOW HAS WOUND VAC, UNSURE IF 
SHE WILL HAVE IT AT TIME OF DC. CM WILL FOLLOW AND ASSIST.   
  
  
  
  
  
  
: Natalie Villegas
 
 DCPIA - Discharge Planning Initial Assessment
 
Updated by [id number]: Natalie Villegas on 19  12:34 pm
*  Is the patient Alert and Oriented?
Yes
*  PCP
[facility]
*  [facility]acy
[facility]
*  Preadmission Environment
Home with Family
*  ADLs
Independent
*  Equipment
Cane
[facility]
*  Community resources currently utilized
Home Health
*  Please name any agencies selected above.
[facility]
*  Additional services required to return to the preadmission environment?
No
*  Can the patient safely return to the preadmission environment?
Yes
*  Has this patient been hospitalized within the prior 30 days at any 
hospital?
No
 
 
 
 
 
Coverage Notice
 
Reviewer: [email or web address] Dwayne Jose
 
Notice Issued Date-Time: 2019  10:18
Notice Type: Patient Choice Letter
 
Notice Delivered To: Patient
Relationship to Patient: Self
Representative Name: 
 
Delivery Method: HAND - Hand Delivered
Mirna Days:
Prior Verbal Notification: 
 
Recipient Understood Notice: Yes
Recipient Signature: Yes
Med Rec Note Co-signed by Attending:
 
Coverage Notice Comment:  [facility] for [facility] and [facility]
 
 
Last DP export: 19   2:15 pm
Patient Name: SUMEET MCGREGOR
Encounter #: [id number]
Page [phone]
 
 
 
 
 
Electronically Signed by MARY JORDAN on 19 at 1641
 
 
 
 
 
 
**All edits/amendments must be made on the electronic document**
 
DICTATION DATE: 19 1640     : GERARD  19 1640     
RPT#: [id number]                                DC DATE:19
                                               STATUS: DIS IN  
[facility]
[address] [place] [address]
***END OF REPORT***

## 2019-09-05 NOTE — NUR
PATIENTS POTASSIUM AND MAGNESIUM WERE LOW THIS AM. ADMINISTERED K-DUR AND
MAGOX PER PRN ORDERS. WILL CTM.

## 2019-09-05 NOTE — MORECARE
CASE MANAGEMENT DISCHARGE SUMMARY
 
 
PATIENT: SUMEET MCGREGOR SHARP             UNIT: [id number]
ACCOUNT#: [id number]                       ADM DATE: 19
AGE: 59     : 60  SEX: F            ROOM/BED: D.2231    
AUTHOR: JULIAN,DOC                             PHYSICIAN:                               
 
REFERRING PHYSICIAN: BERNADETTE FREY DO         
DATE OF SERVICE: 19
Discharge Plan
 
 
Patient Name: SUMEET MCGREGOR
Facility: [facility]:[place]
Encounter #: [id number]
Medical Record #: [id number]
: 1960
Planned Disposition: Home Hlth Svc w Plan Readm
Anticipated Discharge Date: 
 
Discharge Date: 
Expected LOS: 
Initial Reviewer: [email or web address]
Initial Review Date: 2019
Generated: 19  10:20 am 
Comments
 
DCP- Discharge Planning
 
Updated by [id number]: Aziza Jose on 19   8:17 am CT
Dr. Frey has discharged today. He is aware of lab results this am. States 
her nausea is gone. I have called Christine at [facility] and they will 
see her for her evening dose of antibiotic. I informed her that the levaquin 
is due tomorrow. I spoke with Ralph at [facility] and he will give me a time 
that they will be here today for the first instruction. I spoke with patient 
and she is agreeable to discharge plan. She is ambulating in the phan at this 
time. CM will continue to follow and assist with discharge planning/needs. 
Home today with  and infusion services.
DCP- Discharge Planning
 
Updated by [id number]: Aziza Jose on 9/3/19   2:26 pm CT
Received a call from Mari with [facility] pharmacy that Medicaid will cover 
all of the infusion costs. CM to call when patient is discharged for them to 
mix the medication and do first infusion teaching. CM will continue to follow 
and assist with discharge planning/needs.
DCP- Discharge Planning
 
Updated by [id number]: Aziza Jose on 9/3/19  10:17 am CT
I have faxed Dr. Frey's antibiotic orders and clinical to [facility] and 
 
[facility]. I spoke with Ralph with [facility] and Jolie with [facility] and 
[facility] is accepting. CM will continue to follow and assist with discharge 
planning/needs.
DCP- Discharge Planning
 
Updated by [id number]: Aziza Jose on 19   9:22 am CT
Met with patient to discuss discharge planning/needs, She will [facility] on discharge. She states either herself or her fianc? (works from 
midnight til 1400) will be able to deliver home antibiotics. I will need to 
know the antibiotic she is going home with along with the stop date to order 
from [facility]. CM will continue to follow and assist with 
discharge planning/needs.
DCP- Discharge Planning
 
Updated by [id number]: Natalie Villegas on 19  11:36 am CT
Patient Name: SUMEET MCGREGOR                                     
Admission Status: Elective   
Accout number: [id number]                              
Admission Date: 2019   
: 1960                                                        
Admission Diagnosis:INFECT/INFLM REACTION DUE TO INTERNAL RIGHT HIP PROSTH,  
Attending: BERNADETTE FREY                                                
Current LOS:  4   
  
Anticipated DC Date:    
Planned Disposition:    
Primary Insurance: MED[place]ID [place]   
  
  
Discharge Planning Comments: CM MET WITH PATIENT ABOUT DC PLANNING/NEEDS. 
STATES PLANS TO DC TO HOME AND [facility]. NOW HAS WOUND VAC, UNSURE IF 
SHE WILL HAVE IT AT TIME OF DC. CM WILL FOLLOW AND ASSIST.   
  
  
  
  
  
  
: Nataliebarney Villegas
 DCPIA - Discharge Planning Initial Assessment
 
Updated by [id number]: Natalie Nelly on 19  12:34 pm
*  Is the patient Alert and Oriented?
Yes
*  PCP
[facility]
*  [facility]acy
[facility]
*  Preadmission Environment
Home with Family
*  ADLs
Independent
 
*  Equipment
Cane
[facility]
*  Community resources currently utilized
Home Health
*  Please name any agencies selected above.
[facility]
*  Additional services required to return to the preadmission environment?
No
*  Can the patient safely return to the preadmission environment?
Yes
*  Has this patient been hospitalized within the prior 30 days at any 
hospital?
No
 
 
 
 
 
Coverage Notice
 
Reviewer: [email or web address] - Aziza Jose
 
Notice Issued Date-Time: 2019  10:18
Notice Type: Patient Choice Letter
 
Notice Delivered To: Patient
Relationship to Patient: Self
Representative Name: 
 
Delivery Method: HAND - Hand Delivered
Mirna Days:
Prior Verbal Notification: 
 
Recipient Understood Notice: Yes
Recipient Signature: Yes
Med Rec Note Co-signed by Attending:
 
Coverage Notice Comment:  [facility] for [facility] and [facility]
 
Last DP export: 9/3/19   2:33 pm
Patient Name: SUMEET MCGREGOR
 
Encounter #: [id number]
Page [phone]
 
 
 
 
 
Electronically Signed by MARY JORDAN on 19 at 0921
 
 
 
 
 
 
**All edits/amendments must be made on the electronic document**
 
DICTATION DATE: 19     : GERARD  19     
RPT#: [id number]                                DC DATE:        
                                               STATUS: ADM IN  
[facility]
 [place] [address]
***END OF REPORT***

## 2019-09-05 NOTE — MORECARE
CASE MANAGEMENT DISCHARGE SUMMARY
 
 
PATIENT: SUMEET MCGREGOR SHARP             UNIT: [id number]
ACCOUNT#: [id number]                       ADM DATE: 19
AGE: 59     : 60  SEX: F            ROOM/BED: D.2231    
AUTHOR: JULIAN,DOC                             PHYSICIAN:                               
 
REFERRING PHYSICIAN: BERNADETTE FREY DO         
DATE OF SERVICE: 19
Discharge Plan
 
 
Patient Name: SUMEET MCGREGOR
Facility: [facility]:[place]
Encounter #: [id number]
Medical Record #: [id number]
: 1960
Planned Disposition: Home Hlth Svc w Plan Readm
Anticipated Discharge Date: 
 
Discharge Date: 
Expected LOS: 
Initial Reviewer: [email or web address]
Initial Review Date: 2019
Generated: 19   4:15 pm 
Comments
 
DCP- Discharge Planning
 
Updated by [id number]: Aziza Jose on 19   2:12 pm CT
Received primary care's order and sent to [facility] for BUN/CR to be drawn 
every couple of days. I also informed the patient. [facility] has already come 
and gone and taught patient on IV infusion. Patient states she feels 
comfortable with the IV. 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 
Home today with home health and IV infusions.
DCP- Discharge Planning
 
Updated by [id number]: Aziza Jose on 19   8:17 am CT
Dr. Frey has discharged today. He is aware of lab results this am. States 
her nausea is gone. I have called Murielvelma at [facility] and they will 
see her for her evening dose of antibiotic. I informed her that the levaquin 
is due tomorrow. I spoke with Ralph at [facility] and he will give me a time 
that they will be here today for the first instruction. I spoke with patient 
and she is agreeable to discharge plan. She is ambulating in the phan at this 
time. CM will continue to follow and assist with discharge planning/needs. 
Home today with  and infusion services.
DCP- Discharge Planning
 
Updated by [id number]: Aziza Jose on 9/3/19   2:26 pm CT
 
Received a call from Mari with [facility] pharmacy that Medicaid will cover 
all of the infusion costs. CM to call when patient is discharged for them to 
mix the medication and do first infusion teaching. CM will continue to follow 
and assist with discharge planning/needs.
DCP- Discharge Planning
 
Updated by [email or web address]: Aziza Jose on 9/3/19  10:17 am CT
I have faxed Dr. Frey's antibiotic orders and clinical to [facility] and 
[facility]. I spoke with Ralph with [facility] and Jolie with [facility] and 
[facility] is accepting. CM will continue to follow and assist with discharge 
planning/needs.
DCP- Discharge Planning
 
Updated by [id number]: Aziza Jose on 19   9:22 am CT
Met with patient to discuss discharge planning/needs, She will [facility] on discharge. She states either herself or her fianc? (works from 
midnight til 1400) will be able to deliver home antibiotics. I will need to 
know the antibiotic she is going home with along with the stop date to order 
from [facility]. CM will continue to follow and assist with 
discharge planning/needs.
DCP- Discharge Planning
 
Updated by [id number]: Natalie Villegas on 19  11:36 am CT
Patient Name: SUMEET MCGREGOR                                     
Admission Status: Elective   
Accout number: [id number]                              
Admission Date: 2019   
: 1960                                                        
Admission Diagnosis:INFECT/INFLM REACTION DUE TO INTERNAL RIGHT HIP PROSTH,  
Attending: BERNADETTE FREY                                                
Current LOS:  4   
  
Anticipated DC Date:    
Planned Disposition:    
Primary Insurance: [place]ID [place]   
  
  
Discharge Planning Comments: CM MET WITH PATIENT ABOUT DC PLANNING/NEEDS. 
STATES PLANS TO DC TO HOME AND [facility]. NOW HAS WOUND VAC, UNSURE IF 
SHE WILL HAVE IT AT TIME OF DC. CM WILL FOLLOW AND ASSIST.   
  
  
  
  
  
  
: Natalie Villegas
 DCPIA - Discharge Planning Initial Assessment
 
Updated by [id number]: Natalie Villegas on 19  12:34 pm
*  Is the patient Alert and Oriented?
Yes
 
*  PCP
[facility]
*  [facility]acy
[facility]
*  Preadmission Environment
Home with Family
*  ADLs
Independent
*  Equipment
Cane
[facility]
*  Community resources currently utilized
Home Health
*  Please name any agencies selected above.
[facility]
*  Additional services required to return to the preadmission environment?
No
*  Can the patient safely return to the preadmission environment?
Yes
*  Has this patient been hospitalized within the prior 30 days at any 
hospital?
No
 
 
 
 
 
Coverage Notice
 
Reviewer: [email or web address] - Aziza Jose
 
Notice Issued Date-Time: 2019  10:18
Notice Type: Patient Choice Letter
 
Notice Delivered To: Patient
Relationship to Patient: Self
Representative Name: 
 
Delivery Method: HAND - Hand Delivered
Mirna Days:
Prior Verbal Notification: 
 
Recipient Understood Notice: Yes
Recipient Signature: Yes
Med Rec Note Co-signed by Attending:
 
Coverage Notice Comment:  [facility] for [facility] and [facility]
 
Last DP export: 19   8:21 am
Patient Name: SUMEET MCGREGOR
 
Encounter #: [id number]
Page [id number]
 
 
 
 
 
Electronically Signed by MARY JORDAN on 19 at 1515
 
 
 
 
 
 
**All edits/amendments must be made on the electronic document**
 
DICTATION DATE: 19 1515     : GERARD  19 1515     
RPT#: [id number]                                DC DATE:        
                                               STATUS: ADM IN  
[facility]
 [place] [address]
***END OF REPORT***

## 2019-09-05 NOTE — NUR
PT RESTING IN BED WITH EYES CLOSED, EASILY AROUSED TO SPEECH. ALERT AND
ORIENTED, STATES SHE IS FEELING MUCH BETTER TODAY. REQUESTING [employer] FOR BREAKFAST, DIET ORDER PUT IN. IV LOCATED TO LEFT AC AND PICC TO RIGHT
UPPER ARM. NO S/S OF DISTRESS NOTED AT THIS TIME, WILL CONT TO MONITOR.

## 2019-09-06 ENCOUNTER — HOSPITAL ENCOUNTER (OUTPATIENT)
Dept: HOSPITAL 84 - D.LABREF | Age: 59
Discharge: HOME | End: 2019-09-06
Attending: FAMILY MEDICINE
Payer: MEDICAID

## 2019-09-06 VITALS — BODY MASS INDEX: 39.7 KG/M2

## 2019-09-06 DIAGNOSIS — Z47.1: Primary | ICD-10-CM

## 2019-09-06 LAB
BUN SERPL-MCNC: 31 MG/DL (ref 7–18)
CREAT SERPL-MCNC: 3 MG/DL (ref 0.6–1.3)

## 2019-09-07 ENCOUNTER — HOSPITAL ENCOUNTER (OUTPATIENT)
Dept: HOSPITAL 84 - D.LABREF | Age: 59
Discharge: HOME | End: 2019-09-07
Attending: FAMILY MEDICINE
Payer: MEDICAID

## 2019-09-07 VITALS — BODY MASS INDEX: 39.7 KG/M2

## 2019-09-07 DIAGNOSIS — Z47.1: Primary | ICD-10-CM

## 2019-09-07 LAB
BUN SERPL-MCNC: 28 MG/DL (ref 7–18)
BUN SERPL-MCNC: 28 MG/DL (ref 7–18)
CREAT SERPL-MCNC: 2.3 MG/DL (ref 0.6–1.3)
CREAT SERPL-MCNC: 2.3 MG/DL (ref 0.6–1.3)

## 2019-09-09 ENCOUNTER — HOSPITAL ENCOUNTER (OUTPATIENT)
Dept: HOSPITAL 84 - D.LABREF | Age: 59
Discharge: HOME | End: 2019-09-09
Attending: FAMILY MEDICINE
Payer: MEDICAID

## 2019-09-09 VITALS — BODY MASS INDEX: 39.7 KG/M2

## 2019-09-09 DIAGNOSIS — Z47.1: Primary | ICD-10-CM

## 2019-09-09 DIAGNOSIS — I25.10: ICD-10-CM

## 2019-09-09 DIAGNOSIS — I10: ICD-10-CM

## 2019-09-09 LAB
BASOPHILS NFR BLD AUTO: 0.3 % (ref 0–2)
BUN SERPL-MCNC: 20 MG/DL (ref 7–18)
CREAT SERPL-MCNC: 2.2 MG/DL (ref 0.6–1.3)
CRP SERPL-MCNC: 0.7 MG/DL (ref 0–0.9)
EOSINOPHIL NFR BLD: 5.4 % (ref 0–7)
ERYTHROCYTE [DISTWIDTH] IN BLOOD BY AUTOMATED COUNT: 14.2 % (ref 11.5–14.5)
ERYTHROCYTE [SEDIMENTATION RATE] IN BLOOD: 74 MM/HR (ref 0–30)
HCT VFR BLD CALC: 23.6 % (ref 36–48)
HGB BLD-MCNC: 7.9 G/DL (ref 12–16)
IMM GRANULOCYTES NFR BLD: 0.5 % (ref 0–5)
LYMPHOCYTES NFR BLD AUTO: 17.5 % (ref 15–50)
MCH RBC QN AUTO: 28.7 PG (ref 26–34)
MCHC RBC AUTO-ENTMCNC: 33.5 G/DL (ref 31–37)
MCV RBC: 85.8 FL (ref 80–100)
MONOCYTES NFR BLD: 6.9 % (ref 2–11)
NEUTROPHILS NFR BLD AUTO: 69.4 % (ref 40–80)
PLATELET # BLD: 209 10X3/UL (ref 130–400)
PMV BLD AUTO: 9.7 FL (ref 7.4–10.4)
RBC # BLD AUTO: 2.75 10X6/UL (ref 4–5.4)
WBC # BLD AUTO: 5.8 10X3/UL (ref 4.8–10.8)

## 2019-09-09 NOTE — MORECARE
CASE MANAGEMENT DISCHARGE SUMMARY
 
 
PATIENT: SUMEET MCGREGOR SHARP             UNIT: [id number]
ACCOUNT#: [id number]                       ADM DATE: 19
AGE: 59     : 60  SEX: F            ROOM/BED: D.2231    
AUTHOR: JULIAN,DOC                             PHYSICIAN:                               
 
REFERRING PHYSICIAN: BERNADETTE FREY DO         
DATE OF SERVICE: 19
Discharge Plan
 
 
Patient Name: SUMEET MCGREGOR
Facility: [facility]:[place]
Encounter #: [id number]
Medical Record #: [id number]
: 1960
Planned Disposition: Home Hlth Svc w Plan Readm
Anticipated Discharge Date: 
 
Discharge Date: 2019
Expected LOS: 0
Initial Reviewer: [email or web address]
Initial Review Date: 2019
Generated: 19   1:38 pm 
Comments
 
DCP- Discharge Planning
 
Updated by [id number]: Charlene Shea on 19   3:30 pm CT
Darya with [facility] called and stated that she has taught the patient and 
meds have been delivered she stated that she did that around 1400
DCP- Discharge Planning
 
Updated by [email or web address]: Aziza Damon on 19   2:12 pm CT
Received primary care's order and sent to [facility] for BUN/CR to be drawn 
every couple of days. I also informed the patient. [facility] has already come 
and gone and taught patient on IV infusion. Patient states she feels 
comfortable with the IV. 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 
Home today with home health and IV infusions.
DCP- Discharge Planning
 
Updated by [email or web address]: Aziza Jose on 19   8:17 am CT
Dr. Frey has discharged today. He is aware of lab results this am. States 
her nausea is gone. I have called Christine at [facility] and they will 
see her for her evening dose of antibiotic. I informed her that the levaquin 
is due tomorrow. I spoke with Ralph at [facility] and he will give me a time 
that they will be here today for the first instruction. I spoke with patient 
and she is agreeable to discharge plan. She is ambulating in the phan at this 
 
time. CM will continue to follow and assist with discharge planning/needs. 
Home today with HH and infusion services.
DCP- Discharge Planning
 
Updated by [email or web address]: Aziza Jose on 9/3/19   2:26 pm CT
Received a call from Mari with [facility] pharmacy that Medicaid will cover 
all of the infusion costs. CM to call when patient is discharged for them to 
mix the medication and do first infusion teaching. CM will continue to follow 
and assist with discharge planning/needs.
DCP- Discharge Planning
 
Updated by [email or web address]: Aziza Damon on 9/3/19  10:17 am CT
I have faxed Dr. Frey's antibiotic orders and clinical to [facility] and 
[facility]. I spoke with Ralph with [facility] and Jolie with [facility] and 
[facility] is accepting. CM will continue to follow and assist with discharge 
planning/needs.
DCP- Discharge Planning
 
Updated by [email or web address]: Aziza Jose on 19   9:22 am CT
Met with patient to discuss discharge planning/needs, She will resume [facility] on discharge. She states either herself or her fianc? (works from 
midnight til 1400) will be able to deliver home antibiotics. I will need to 
know the antibiotic she is going home with along with the stop date to order 
from [facility]. CM will continue to follow and assist with 
discharge planning/needs.
DCP- Discharge Planning
 
Updated by [email or web address]: Natalie Villegas on 19  11:36 am CT
Patient Name: SUMEET MCGREGOR                                     
Admission Status: Elective   
Accout number: [id number]                              
Admission Date: 2019   
: 1960                                                        
Admission Diagnosis:INFECT/INFLM REACTION DUE TO INTERNAL RIGHT HIP PROSTH,  
Attending: BERNADETTE FREY                                                
Current LOS:  4   
  
Anticipated DC Date:    
Planned Disposition:    
Primary Insurance: MEDICAID [place]   
  
  
Discharge Planning Comments: CM MET WITH PATIENT ABOUT DC PLANNING/NEEDS. 
STATES PLANS TO DC TO HOME AND RESUME [facility]. NOW HAS WOUND VAC, UNSURE IF 
SHE WILL HAVE IT AT TIME OF DC. CM WILL FOLLOW AND ASSIST.   
  
  
  
  
  
  
: Natalie Villegas
 
 DCPIA - Discharge Planning Initial Assessment
 
Updated by [email or web address]: Natalie Villegas on 19  12:34 pm
*  Is the patient Alert and Oriented?
Yes
*  PCP
[facility]
*  Pharmacy
[facility]
*  Preadmission Environment
Home with Family
*  ADLs
Independent
*  Equipment
Cane
Walker
*  Community resources currently utilized
Home Health
*  Please name any agencies selected above.
[facility]
*  Additional services required to return to the preadmission environment?
No
*  Can the patient safely return to the preadmission environment?
Yes
*  Has this patient been hospitalized within the prior 30 days at any 
hospital?
No
 
 
 
 
 
Coverage Notice
 
Reviewer: [email or web address] Dwayne Jose
 
Notice Issued Date-Time: 2019  10:18
Notice Type: Patient Choice Letter
 
Notice Delivered To: Patient
Relationship to Patient: Self
Representative Name: 
 
Delivery Method: HAND - Hand Delivered
Mirna Days:
Prior Verbal Notification: 
 
Recipient Understood Notice: Yes
Recipient Signature: Yes
Med Rec Note Co-signed by Attending:
 
Coverage Notice Comment:  [facility] for [facility] and [facility]
 
 
Last DP export: 19   3:41 pm
Patient Name: SUMEET MCGREGOR
Encounter #: [id number]
Page [id number]
 
 
 
 
 
Electronically Signed by MARY JORDAN on 19 at 1238
 
 
 
 
 
 
**All edits/amendments must be made on the electronic document**
 
DICTATION DATE: 19 1238     : GERARD  19 1238     
RPT#: [id number]                                DC DATE:19
                                               STATUS: DIS IN  
[facility]
[address] [place] [address]
***END OF REPORT***

## 2019-09-11 ENCOUNTER — HOSPITAL ENCOUNTER (OUTPATIENT)
Dept: HOSPITAL 84 - D.LABREF | Age: 59
Discharge: HOME | End: 2019-09-11
Attending: FAMILY MEDICINE
Payer: MEDICAID

## 2019-09-11 VITALS — BODY MASS INDEX: 39.7 KG/M2

## 2019-09-11 DIAGNOSIS — Z79.2: ICD-10-CM

## 2019-09-11 DIAGNOSIS — Z47.1: ICD-10-CM

## 2019-09-11 DIAGNOSIS — I10: ICD-10-CM

## 2019-09-11 DIAGNOSIS — I25.10: Primary | ICD-10-CM

## 2019-09-11 LAB
BUN SERPL-MCNC: 18 MG/DL (ref 7–18)
CREAT SERPL-MCNC: 1.9 MG/DL (ref 0.6–1.3)

## 2019-09-16 ENCOUNTER — HOSPITAL ENCOUNTER (OUTPATIENT)
Dept: HOSPITAL 84 - D.LABREF | Age: 59
Discharge: HOME | End: 2019-09-16
Attending: FAMILY MEDICINE
Payer: MEDICAID

## 2019-09-16 VITALS — BODY MASS INDEX: 39.7 KG/M2

## 2019-09-16 DIAGNOSIS — I25.10: ICD-10-CM

## 2019-09-16 DIAGNOSIS — I10: ICD-10-CM

## 2019-09-16 DIAGNOSIS — Z47.1: Primary | ICD-10-CM

## 2019-09-16 LAB
BASOPHILS NFR BLD AUTO: 0.5 % (ref 0–2)
BUN SERPL-MCNC: 17 MG/DL (ref 7–18)
CREAT SERPL-MCNC: 1.5 MG/DL (ref 0.6–1.3)
CRP SERPL-MCNC: 0 MG/DL (ref 0–0.9)
EOSINOPHIL NFR BLD: 5.4 % (ref 0–7)
ERYTHROCYTE [DISTWIDTH] IN BLOOD BY AUTOMATED COUNT: 14.4 % (ref 11.5–14.5)
ERYTHROCYTE [SEDIMENTATION RATE] IN BLOOD: 45 MM/HR (ref 0–30)
HCT VFR BLD CALC: 28.6 % (ref 36–48)
HGB BLD-MCNC: 9.7 G/DL (ref 12–16)
IMM GRANULOCYTES NFR BLD: 0.2 % (ref 0–5)
LYMPHOCYTES NFR BLD AUTO: 20.9 % (ref 15–50)
MCH RBC QN AUTO: 28.4 PG (ref 26–34)
MCHC RBC AUTO-ENTMCNC: 33.9 G/DL (ref 31–37)
MCV RBC: 83.6 FL (ref 80–100)
MONOCYTES NFR BLD: 7.8 % (ref 2–11)
NEUTROPHILS NFR BLD AUTO: 65.2 % (ref 40–80)
PLATELET # BLD: 148 10X3/UL (ref 130–400)
PMV BLD AUTO: 10.2 FL (ref 7.4–10.4)
RBC # BLD AUTO: 3.42 10X6/UL (ref 4–5.4)
WBC # BLD AUTO: 5.8 10X3/UL (ref 4.8–10.8)

## 2019-09-23 ENCOUNTER — HOSPITAL ENCOUNTER (OUTPATIENT)
Dept: HOSPITAL 84 - D.LABREF | Age: 59
Discharge: HOME | End: 2019-09-23
Attending: FAMILY MEDICINE
Payer: MEDICAID

## 2019-09-23 VITALS — BODY MASS INDEX: 39.7 KG/M2

## 2019-09-23 DIAGNOSIS — Z79.899: ICD-10-CM

## 2019-09-23 DIAGNOSIS — Z47.1: Primary | ICD-10-CM

## 2019-09-23 LAB
BASOPHILS NFR BLD AUTO: 0.2 % (ref 0–2)
BUN SERPL-MCNC: 16 MG/DL (ref 7–18)
CREAT SERPL-MCNC: 1.1 MG/DL (ref 0.6–1.3)
CRP SERPL-MCNC: 0 MG/DL (ref 0–0.9)
EOSINOPHIL NFR BLD: 7.5 % (ref 0–7)
ERYTHROCYTE [DISTWIDTH] IN BLOOD BY AUTOMATED COUNT: 14.3 % (ref 11.5–14.5)
ERYTHROCYTE [SEDIMENTATION RATE] IN BLOOD: 24 MM/HR (ref 0–30)
HCT VFR BLD CALC: 27.6 % (ref 36–48)
HGB BLD-MCNC: 9.2 G/DL (ref 12–16)
IMM GRANULOCYTES NFR BLD: 0.2 % (ref 0–5)
LYMPHOCYTES NFR BLD AUTO: 24.8 % (ref 15–50)
MCH RBC QN AUTO: 28.3 PG (ref 26–34)
MCHC RBC AUTO-ENTMCNC: 33.3 G/DL (ref 31–37)
MCV RBC: 84.9 FL (ref 80–100)
MONOCYTES NFR BLD: 11.2 % (ref 2–11)
NEUTROPHILS NFR BLD AUTO: 56.1 % (ref 40–80)
PLATELET # BLD: 109 10X3/UL (ref 130–400)
PMV BLD AUTO: 11.4 FL (ref 7.4–10.4)
RBC # BLD AUTO: 3.25 10X6/UL (ref 4–5.4)
WBC # BLD AUTO: 4.3 10X3/UL (ref 4.8–10.8)

## 2019-09-30 ENCOUNTER — HOSPITAL ENCOUNTER (OUTPATIENT)
Dept: HOSPITAL 84 - D.LABREF | Age: 59
Discharge: HOME | End: 2019-09-30
Attending: FAMILY MEDICINE
Payer: MEDICAID

## 2019-09-30 VITALS — BODY MASS INDEX: 39.7 KG/M2

## 2019-09-30 DIAGNOSIS — Z47.1: Primary | ICD-10-CM

## 2019-09-30 LAB
BUN SERPL-MCNC: 20 MG/DL (ref 7–18)
CRP SERPL-MCNC: 0 MG/DL (ref 0–0.9)
EOSINOPHIL NFR BLD: 6 % (ref 0–7)
ERYTHROCYTE [DISTWIDTH] IN BLOOD BY AUTOMATED COUNT: 14 % (ref 11.5–14.5)
ERYTHROCYTE [SEDIMENTATION RATE] IN BLOOD: 35 MM/HR (ref 0–30)
HCT VFR BLD CALC: 29.8 % (ref 36–48)
HGB BLD-MCNC: 9.6 G/DL (ref 12–16)
LYMPHOCYTES NFR BLD AUTO: 30 % (ref 15–50)
MCH RBC QN AUTO: 28 PG (ref 26–34)
MCHC RBC AUTO-ENTMCNC: 32.2 G/DL (ref 31–37)
MCV RBC: 86.9 FL (ref 80–100)
NEUTROPHILS NFR BLD AUTO: 64 % (ref 40–80)
PLATELET # BLD EST: NORMAL 10*3/UL
PLATELET # BLD: 144 10X3/UL (ref 130–400)
PMV BLD AUTO: 10.5 FL (ref 7.4–10.4)
RBC # BLD AUTO: 3.43 10X6/UL (ref 4–5.4)
WBC # BLD AUTO: 5.2 10X3/UL (ref 4.8–10.8)

## 2019-10-04 ENCOUNTER — HOSPITAL ENCOUNTER (OUTPATIENT)
Dept: HOSPITAL 84 - D.LABREF | Age: 59
Discharge: HOME | End: 2019-10-04
Attending: INTERNAL MEDICINE
Payer: MEDICAID

## 2019-10-04 VITALS — BODY MASS INDEX: 39.7 KG/M2

## 2019-10-04 DIAGNOSIS — N39.0: Primary | ICD-10-CM

## 2019-10-04 DIAGNOSIS — B99.9: ICD-10-CM

## 2019-10-04 DIAGNOSIS — I25.10: ICD-10-CM

## 2019-10-04 LAB
ANION GAP SERPL CALC-SCNC: 14.9 MMOL/L (ref 8–16)
BUN SERPL-MCNC: 21 MG/DL (ref 7–18)
CALCIUM SERPL-MCNC: 8.4 MG/DL (ref 8.5–10.1)
CHLORIDE SERPL-SCNC: 107 MMOL/L (ref 98–107)
CO2 SERPL-SCNC: 25.6 MMOL/L (ref 21–32)
CREAT SERPL-MCNC: 1.2 MG/DL (ref 0.6–1.3)
GLUCOSE SERPL-MCNC: 92 MG/DL (ref 74–106)
OSMOLALITY SERPL CALC.SUM OF ELEC: 287 MOSM/KG (ref 275–300)
PHOSPHATE SERPL-MCNC: 4.1 MG/DL (ref 2.5–4.9)
POTASSIUM SERPL-SCNC: 4.5 MMOL/L (ref 3.5–5.1)
SODIUM SERPL-SCNC: 143 MMOL/L (ref 136–145)

## 2019-10-07 ENCOUNTER — HOSPITAL ENCOUNTER (OUTPATIENT)
Dept: HOSPITAL 84 - D.LABREF | Age: 59
Discharge: HOME | End: 2019-10-07
Attending: FAMILY MEDICINE
Payer: MEDICAID

## 2019-10-07 VITALS — BODY MASS INDEX: 39.7 KG/M2

## 2019-10-07 DIAGNOSIS — Z47.1: Primary | ICD-10-CM

## 2019-10-07 LAB
BASOPHILS NFR BLD AUTO: 0.5 % (ref 0–2)
BUN SERPL-MCNC: 21 MG/DL (ref 7–18)
CREAT SERPL-MCNC: 1.2 MG/DL (ref 0.6–1.3)
CRP SERPL-MCNC: 0 MG/DL (ref 0–0.9)
EOSINOPHIL NFR BLD: 8.4 % (ref 0–7)
ERYTHROCYTE [DISTWIDTH] IN BLOOD BY AUTOMATED COUNT: 14 % (ref 11.5–14.5)
ERYTHROCYTE [SEDIMENTATION RATE] IN BLOOD: 43 MM/HR (ref 0–30)
HCT VFR BLD CALC: 29.7 % (ref 36–48)
HGB BLD-MCNC: 9.6 G/DL (ref 12–16)
IMM GRANULOCYTES NFR BLD: 0.2 % (ref 0–5)
LYMPHOCYTES NFR BLD AUTO: 18.8 % (ref 15–50)
MCH RBC QN AUTO: 28.3 PG (ref 26–34)
MCHC RBC AUTO-ENTMCNC: 32.3 G/DL (ref 31–37)
MCV RBC: 87.6 FL (ref 80–100)
MONOCYTES NFR BLD: 10 % (ref 2–11)
NEUTROPHILS NFR BLD AUTO: 62.1 % (ref 40–80)
PLATELET # BLD: 171 10X3/UL (ref 130–400)
PMV BLD AUTO: 10.5 FL (ref 7.4–10.4)
RBC # BLD AUTO: 3.39 10X6/UL (ref 4–5.4)
WBC # BLD AUTO: 4.4 10X3/UL (ref 4.8–10.8)

## 2019-10-14 ENCOUNTER — HOSPITAL ENCOUNTER (OUTPATIENT)
Dept: HOSPITAL 84 - D.LABREF | Age: 59
Discharge: HOME | End: 2019-10-14
Attending: FAMILY MEDICINE
Payer: MEDICAID

## 2019-10-14 VITALS — BODY MASS INDEX: 39.7 KG/M2

## 2019-10-14 DIAGNOSIS — Z47.1: Primary | ICD-10-CM

## 2019-10-14 DIAGNOSIS — I10: ICD-10-CM

## 2019-10-14 DIAGNOSIS — Z79.2: ICD-10-CM

## 2019-10-14 LAB
BASOPHILS NFR BLD AUTO: 0.2 % (ref 0–2)
BUN SERPL-MCNC: 15 MG/DL (ref 7–18)
CREAT SERPL-MCNC: 1 MG/DL (ref 0.6–1.3)
CRP SERPL-MCNC: 0 MG/DL (ref 0–0.9)
EOSINOPHIL NFR BLD: 6.8 % (ref 0–7)
ERYTHROCYTE [DISTWIDTH] IN BLOOD BY AUTOMATED COUNT: 13.8 % (ref 11.5–14.5)
ERYTHROCYTE [SEDIMENTATION RATE] IN BLOOD: 52 MM/HR (ref 0–30)
HCT VFR BLD CALC: 32.4 % (ref 36–48)
HGB BLD-MCNC: 10.3 G/DL (ref 12–16)
IMM GRANULOCYTES NFR BLD: 0.4 % (ref 0–5)
LYMPHOCYTES NFR BLD AUTO: 21.7 % (ref 15–50)
MCH RBC QN AUTO: 28.1 PG (ref 26–34)
MCHC RBC AUTO-ENTMCNC: 31.8 G/DL (ref 31–37)
MCV RBC: 88.3 FL (ref 80–100)
MONOCYTES NFR BLD: 6.3 % (ref 2–11)
NEUTROPHILS NFR BLD AUTO: 64.6 % (ref 40–80)
PLATELET # BLD: 186 10X3/UL (ref 130–400)
PMV BLD AUTO: 11.2 FL (ref 7.4–10.4)
RBC # BLD AUTO: 3.67 10X6/UL (ref 4–5.4)
WBC # BLD AUTO: 5.6 10X3/UL (ref 4.8–10.8)

## 2019-12-02 ENCOUNTER — HOSPITAL ENCOUNTER (OUTPATIENT)
Dept: HOSPITAL 84 - D.LABREF | Age: 59
Discharge: HOME | End: 2019-12-02
Attending: ORTHOPAEDIC SURGERY
Payer: MEDICAID

## 2019-12-02 VITALS — BODY MASS INDEX: 39.7 KG/M2

## 2019-12-02 DIAGNOSIS — M25.551: Primary | ICD-10-CM

## 2019-12-02 LAB
ALBUMIN SERPL-MCNC: 3.8 G/DL (ref 3.4–5)
ALP SERPL-CCNC: 142 U/L (ref 46–116)
ALT SERPL-CCNC: 25 U/L (ref 10–68)
ANION GAP SERPL CALC-SCNC: 12.3 MMOL/L (ref 8–16)
BASOPHILS NFR BLD AUTO: 0.1 % (ref 0–2)
BILIRUB SERPL-MCNC: 0.28 MG/DL (ref 0.2–1.3)
BUN SERPL-MCNC: 32 MG/DL (ref 7–18)
CALCIUM SERPL-MCNC: 9.3 MG/DL (ref 8.5–10.1)
CHLORIDE SERPL-SCNC: 107 MMOL/L (ref 98–107)
CO2 SERPL-SCNC: 25.4 MMOL/L (ref 21–32)
CREAT SERPL-MCNC: 1.3 MG/DL (ref 0.6–1.3)
CRP SERPL-MCNC: < 0.2 MG/DL (ref 0–0.9)
EOSINOPHIL NFR BLD: 5.2 % (ref 0–7)
ERYTHROCYTE [DISTWIDTH] IN BLOOD BY AUTOMATED COUNT: 14.3 % (ref 11.5–14.5)
ERYTHROCYTE [SEDIMENTATION RATE] IN BLOOD: 75 MM/HR (ref 0–30)
GLOBULIN SER-MCNC: 3.4 G/L
GLUCOSE SERPL-MCNC: 92 MG/DL (ref 74–106)
HCT VFR BLD CALC: 37.5 % (ref 36–48)
HGB BLD-MCNC: 12.2 G/DL (ref 12–16)
IMM GRANULOCYTES NFR BLD: 0.1 % (ref 0–5)
LYMPHOCYTES NFR BLD AUTO: 23.9 % (ref 15–50)
MCH RBC QN AUTO: 28.2 PG (ref 26–34)
MCHC RBC AUTO-ENTMCNC: 32.5 G/DL (ref 31–37)
MCV RBC: 86.8 FL (ref 80–100)
MONOCYTES NFR BLD: 7.6 % (ref 2–11)
NEUTROPHILS NFR BLD AUTO: 63.1 % (ref 40–80)
OSMOLALITY SERPL CALC.SUM OF ELEC: 285 MOSM/KG (ref 275–300)
PLATELET # BLD: 236 10X3/UL (ref 130–400)
PMV BLD AUTO: 9.9 FL (ref 7.4–10.4)
POTASSIUM SERPL-SCNC: 4.7 MMOL/L (ref 3.5–5.1)
PROT SERPL-MCNC: 7.2 G/DL (ref 6.4–8.2)
RBC # BLD AUTO: 4.32 10X6/UL (ref 4–5.4)
SODIUM SERPL-SCNC: 140 MMOL/L (ref 136–145)
WBC # BLD AUTO: 6.7 10X3/UL (ref 4.8–10.8)

## 2020-01-14 ENCOUNTER — HOSPITAL ENCOUNTER (OUTPATIENT)
Dept: HOSPITAL 84 - D.RAD | Age: 60
Discharge: HOME | End: 2020-01-14
Attending: NURSE PRACTITIONER
Payer: MEDICAID

## 2020-01-14 VITALS — BODY MASS INDEX: 39.7 KG/M2

## 2020-01-14 DIAGNOSIS — M25.551: ICD-10-CM

## 2020-01-14 DIAGNOSIS — M25.531: Primary | ICD-10-CM

## 2020-02-27 ENCOUNTER — HOSPITAL ENCOUNTER (OUTPATIENT)
Dept: HOSPITAL 84 - D.LAB | Age: 60
Discharge: HOME | End: 2020-02-27
Attending: ORTHOPAEDIC SURGERY
Payer: MEDICAID

## 2020-02-27 VITALS — BODY MASS INDEX: 39.7 KG/M2

## 2020-02-27 DIAGNOSIS — M25.551: Primary | ICD-10-CM

## 2020-02-27 LAB
BASOPHILS NFR BLD AUTO: 0.2 % (ref 0–2)
EOSINOPHIL NFR BLD: 2.4 % (ref 0–7)
ERYTHROCYTE [DISTWIDTH] IN BLOOD BY AUTOMATED COUNT: 12.8 % (ref 11.5–14.5)
ERYTHROCYTE [SEDIMENTATION RATE] IN BLOOD: 93 MM/HR (ref 0–30)
HCT VFR BLD CALC: 34.8 % (ref 36–48)
HGB BLD-MCNC: 11.6 G/DL (ref 12–16)
IMM GRANULOCYTES NFR BLD: 0.1 % (ref 0–5)
LYMPHOCYTES NFR BLD AUTO: 15.9 % (ref 15–50)
MCH RBC QN AUTO: 28.7 PG (ref 26–34)
MCHC RBC AUTO-ENTMCNC: 33.3 G/DL (ref 31–37)
MCV RBC: 86.1 FL (ref 80–100)
MONOCYTES NFR BLD: 6.1 % (ref 2–11)
NEUTROPHILS NFR BLD AUTO: 75.3 % (ref 40–80)
PLATELET # BLD: 345 10X3/UL (ref 130–400)
PMV BLD AUTO: 9.3 FL (ref 7.4–10.4)
RBC # BLD AUTO: 4.04 10X6/UL (ref 4–5.4)
WBC # BLD AUTO: 8.7 10X3/UL (ref 4.8–10.8)

## 2020-02-28 ENCOUNTER — HOSPITAL ENCOUNTER (OUTPATIENT)
Dept: HOSPITAL 84 - D.MAMMO | Age: 60
Discharge: HOME | End: 2020-02-28
Attending: NURSE PRACTITIONER
Payer: MEDICAID

## 2020-02-28 VITALS — BODY MASS INDEX: 39.7 KG/M2

## 2020-02-28 DIAGNOSIS — Z12.31: Primary | ICD-10-CM

## 2020-03-04 ENCOUNTER — HOSPITAL ENCOUNTER (OUTPATIENT)
Dept: HOSPITAL 84 - D.RAD | Age: 60
Discharge: HOME | End: 2020-03-04
Attending: ORTHOPAEDIC SURGERY
Payer: MEDICAID

## 2020-03-04 VITALS — BODY MASS INDEX: 39.7 KG/M2

## 2020-03-04 DIAGNOSIS — M25.551: Primary | ICD-10-CM

## 2020-03-04 DIAGNOSIS — M54.16: ICD-10-CM

## 2020-03-31 ENCOUNTER — HOSPITAL ENCOUNTER (OUTPATIENT)
Dept: HOSPITAL 84 - D.MAMMO | Age: 60
Discharge: HOME | End: 2020-03-31
Attending: NURSE PRACTITIONER
Payer: MEDICAID

## 2020-03-31 VITALS — BODY MASS INDEX: 39.7 KG/M2

## 2020-03-31 DIAGNOSIS — R92.8: Primary | ICD-10-CM

## 2020-08-05 LAB
ERYTHROCYTE [DISTWIDTH] IN BLOOD BY AUTOMATED COUNT: 13.7 % (ref 11.5–14.5)
HCT VFR BLD CALC: 41 % (ref 36–48)
HGB BLD-MCNC: 13.7 G/DL (ref 12–16)
MCH RBC QN AUTO: 29.2 PG (ref 26–34)
MCHC RBC AUTO-ENTMCNC: 33.4 G/DL (ref 31–37)
MCV RBC: 87.4 FL (ref 80–100)
PLATELET # BLD: 237 10X3/UL (ref 130–400)
PMV BLD AUTO: 9.4 FL (ref 7.4–10.4)
RBC # BLD AUTO: 4.69 10X6/UL (ref 4–5.4)
WBC # BLD AUTO: 6.7 10X3/UL (ref 4.8–10.8)

## 2020-08-07 ENCOUNTER — HOSPITAL ENCOUNTER (OUTPATIENT)
Dept: HOSPITAL 84 - D.OPS | Age: 60
Discharge: HOME | End: 2020-08-07
Attending: ORTHOPAEDIC SURGERY
Payer: MEDICAID

## 2020-08-07 VITALS
HEIGHT: 59 IN | BODY MASS INDEX: 40.32 KG/M2 | BODY MASS INDEX: 40.32 KG/M2 | WEIGHT: 200 LBS | WEIGHT: 200 LBS | HEIGHT: 59 IN

## 2020-08-07 VITALS — DIASTOLIC BLOOD PRESSURE: 71 MMHG | SYSTOLIC BLOOD PRESSURE: 130 MMHG

## 2020-08-07 DIAGNOSIS — K21.9: ICD-10-CM

## 2020-08-07 DIAGNOSIS — M18.11: Primary | ICD-10-CM

## 2020-08-07 DIAGNOSIS — I10: ICD-10-CM

## 2020-08-07 DIAGNOSIS — J45.909: ICD-10-CM

## 2020-08-10 NOTE — OP
PATIENT NAME:  SUMEET MCGREGOR                   MEDICAL RECORD: [id number]
:60                                             LOCATION:[facility]          
                                                         ADMISSION DATE:        
SURGEON:  LORNE FREY DO         
 
 
DATE OF OPERATION:  2020
 
PROCEDURE PERFORMED:  Right thumb CMC arthroplasty.
 
PREOPERATIVE DIAGNOSIS:  Right thumb CMC joint arthritis, end-stage.
 
POSTOPERATIVE DIAGNOSIS:  Right thumb CMC joint arthritis, end-stage.
 
INDICATIONS:  Ms. Soto is a 60-year-old female who has had this right thumb
CMC pain for quite some time.  She has tried injections and bracing to no avail
and she wants something done surgical.  She could not do pinch and  and
having the pain constantly.  I informed her of the risks including infection,
bleeding, damage to nerves and vessels, need for further surgery, continued
pain, failure of implants, loss of pinch  with the thumb damage to the
superficial radial nerve in the area and need for revision.  She signed the
consent.
 
SURGEON:  Lorne Frey DO
 
DESCRIPTION OF PROCEDURE:  The patient was taken to the operative suite, laid in
supine position, given general anesthetic and LMA was placed.  She was given 2
grams of Ancef preoperatively.  The right upper extremity was then prepped and
draped in sterile fashion.  A timeout was performed.  Everyone was agreed with
correct side, site, patient, and procedure.  We then began by exsanguinating the
right upper extremity with an Esmarch, tourniquet was inflated to 250 mmHg, was
up for 34 minutes.  Then made an incision on the radial border of the thumb and
made careful dissection down to the CMC joint, peeled off the capsule and made a
spot for the button to go for the implant.  I then made an incision over the
second metacarpal and dissected down to the second metacarpal, put the pin into
the base of the thumb through the second metacarpal and then ran the FiberWire
through that with the button on the side and ran it from the second metacarpal
to the first metacarpal and tied a button down holding it into place.  I then
removed the trapezium.  Once it was all removed, I then tensioned the button and
the suture appropriately under x-ray and tied it down on the thumb side.  Once
that was in appropriate position and did not have much subluxation with
pressure, I then closed the capsule with 2-0 Vicryl in a figure-of-eight and
simple fashion.  The tourniquet was then let down and bleeding was coagulated
with the bipolar and Jason Lancaster, certified surgical assistant closed the skin
first with a 3-0 Vicryl in inverted interrupted fashion and then 5-0 Monocryl
running on the skin on the thumb side and then a 5-0 Monocryl in a horizontal
mattress fashion over the second metacarpal.  We then dressed with Adaptic, 4 x
4s, ABD, cast padding and put a thumb spica splint on the patient and secured
with an Ace wrap.  The patient was then awakened and taken to recovery in stable
condition.
 
BLOOD LOSS:  Minimal.
 
COMPLICATIONS:  None.
 
The tourniquet was let down at 34 minutes.
 
TRANSINT:[id number] Voice Confirmation ID: [id number] DOCUMENT ID: [id number]
 
 
 
OPERATIVE REPORT                               [id number]    SUMEET MCGREGOR MICHAEL D, DO         
 
 
 
Electronically Signed by LORNE EDWARD on 08/10/20 at 0818
 
 
 
 
 
 
 
 
 
 
 
 
 
 
 
 
 
 
 
 
 
 
 
 
 
 
 
 
 
 
 
 
 
 
 
 
 
 
 
 
 
CC:                                                             [id number]
DICTATION DATE: 20 1324     :     20 0147      [facility] 
                                                                      [id number] [place] [address]

## 2021-05-28 ENCOUNTER — HOSPITAL ENCOUNTER (OUTPATIENT)
Dept: HOSPITAL 84 - D.MAMMO | Age: 61
Discharge: HOME | End: 2021-05-28
Attending: FAMILY MEDICINE
Payer: MEDICARE

## 2021-05-28 VITALS — BODY MASS INDEX: 40.5 KG/M2

## 2021-05-28 DIAGNOSIS — Z12.31: Primary | ICD-10-CM
